# Patient Record
Sex: FEMALE | Race: WHITE | NOT HISPANIC OR LATINO | Employment: OTHER | URBAN - METROPOLITAN AREA
[De-identification: names, ages, dates, MRNs, and addresses within clinical notes are randomized per-mention and may not be internally consistent; named-entity substitution may affect disease eponyms.]

---

## 2017-01-09 ENCOUNTER — OFFICE VISIT (OUTPATIENT)
Dept: NEUROSURGERY | Facility: CLINIC | Age: 52
End: 2017-01-09

## 2017-01-09 VITALS — BODY MASS INDEX: 24.35 KG/M2 | WEIGHT: 124 LBS | HEIGHT: 60 IN | TEMPERATURE: 97.4 F

## 2017-01-09 DIAGNOSIS — M50.30 DEGENERATIVE DISC DISEASE, CERVICAL: ICD-10-CM

## 2017-01-09 DIAGNOSIS — M54.2 NECK PAIN: Primary | ICD-10-CM

## 2017-01-09 PROCEDURE — 99214 OFFICE O/P EST MOD 30 MIN: CPT | Performed by: NEUROLOGICAL SURGERY

## 2017-01-09 NOTE — MR AVS SNAPSHOT
Lissa Cheng   2017 12:30 PM   Office Visit    Dept Phone:  [phone]   Encounter #:  [id number]    Provider:  Roscoe Saini MD   Department:  [facility]                Your Full Care Plan              Your Updated Medication List          This list is accurate as of: 17  1:08 PM.  Always use your most recent med list.                DEXILANT 60 MG capsule   Generic drug:  dexlansoprazole       fexofenadine 180 MG tablet   Commonly known as:  ALLEGRA       metroNIDAZOLE 0.75 % cream   Commonly known as:  METROCREAM       raloxifene 60 MG tablet   Commonly known as:  EVISTA               You Were Diagnosed With        Codes Comments    Neck pain    -  Primary ICD-10-CM: M54.2  ICD-9-CM: 723.1     Degenerative disc disease, cervical     ICD-10-CM: M50.30  ICD-9-CM: 722.4       Instructions     None    Patient Instructions History      Upcoming Appointments     Visit Type Date Time Department    OFFICE VISIT 2017 12:30 PM [facility]      [facility] Signup     [facility] [employer] allows you to send messages to your doctor, view your test results, renew your prescriptions, schedule appointments, and more. To sign up, go to [employer] and click on the Sign Up Now link in the New User? box. Enter your [employer] Activation Code exactly as it appears below along with the last four digits of your Social Security Number and your Date of Birth () to complete the sign-up process. If you do not sign up before the expiration date, you must request a new code.    [employer] Activation Code: [email or web address]  Expires: 2017  1:08 PM    If you have questions, you can email [email or web address] or call [phone] to talk to our [employer] staff. Remember, [employer] is NOT to be used for urgent needs. For medical emergencies, dial 911.               Other Info from Your Visit           Allergies     No Known  "Allergies      Reason for Visit     Follow-up           Vital Signs     Temperature Height Weight Body Mass Index Smoking Status       97.4 °F (36.3 °C) 60\" (152.4 cm) 124 lb (56.2 kg) 24.22 kg/m2 Never Smoker       Problems and Diagnoses Noted     Neck pain    -  Primary    Degeneration of intervertebral disc of cervical region            "

## 2017-01-09 NOTE — LETTER
January 9, 2017     KIMBERLY Harper  [address] [facility]  [place] [address]    Patient: Lissa Cheng   YOB: 1965   Date of Visit: 1/9/2017       Dear KIMBERLY Mariano:    Lissa Cheng was in my office today. Below is a copy of my note.    If you have questions, please do not hesitate to call me. I look forward to following Lissa along with you.         Sincerely,        Roscoe Saini MD        CC: MD Massimo Leigh MD    Subjective     Chief Complaint: Neck pain    Patient ID: Lissa Cheng is a 51 y.o. female is here today for follow-up.    Neck Pain    This is a chronic problem. The current episode started more than 1 year ago. The problem occurs every several days. The problem has been unchanged. The pain is associated with nothing. The pain is present in the left side. The quality of the pain is described as aching and burning. The pain is at a severity of 6/10. The pain is moderate. The symptoms are aggravated by bending and coughing (Driving). Pertinent negatives include no chest pain, fever, headaches, leg pain, numbness, pain with swallowing, paresis, photophobia, trouble swallowing, visual change or weakness. She has tried acetaminophen, home exercises and NSAIDs for the symptoms. The treatment provided mild relief.       This is a 51-year-old woman who presents to my office with a chief complaint of neck pain which is slightly eccentric to the right side and occasional numbness radiating into her right arm.  She is undergoing chiropractic manipulation, which she states helps her neck pain for about a week before she has to go back.  She really has not tried much in the way of conservative treatment, specifically, she has not done any physical therapy, pain injections although she does take anti-inflammatories on an as-needed basis.    The following portions of the patient's history were reviewed and updated  as appropriate: allergies, current medications, past family history, past medical history, past social history, past surgical history and problem list.    Family history:   Family History   Problem Relation Age of Onset   • Cancer Mother    • Breast cancer Mother    • Heart attack Father    • Cancer Sister    • Breast cancer Sister    • Cancer Sister    • Colon cancer Sister    • Breast cancer Other    • Colon cancer Other        Social history:   Social History     Social History   • Marital status:      Spouse name: N/A   • Number of children: N/A   • Years of education: N/A     Occupational History   • Not on file.     Social History Main Topics   • Smoking status: Never Smoker   • Smokeless tobacco: Never Used   • Alcohol use No   • Drug use: No   • Sexual activity: Defer     Other Topics Concern   • Not on file     Social History Narrative       Review of Systems   Constitutional: Negative for activity change, appetite change, chills, diaphoresis, fatigue, fever and unexpected weight change.   HENT: Negative for congestion, dental problem, drooling, ear discharge, ear pain, facial swelling, hearing loss, mouth sores, nosebleeds, postnasal drip, rhinorrhea, sinus pressure, sneezing, sore throat, tinnitus, trouble swallowing and voice change.    Eyes: Negative for photophobia, pain, discharge, redness, itching and visual disturbance.   Respiratory: Negative for apnea, cough, choking, chest tightness, shortness of breath, wheezing and stridor.    Cardiovascular: Negative for chest pain, palpitations and leg swelling.   Gastrointestinal: Negative for abdominal distention, abdominal pain, anal bleeding, blood in stool, constipation, diarrhea, nausea, rectal pain and vomiting.   Genitourinary: Negative for difficulty urinating and hematuria.   Musculoskeletal: Positive for arthralgias, neck pain and neck stiffness. Negative for back pain, gait problem, joint swelling and myalgias.   Skin: Negative for color  "change, pallor, rash and wound.   Allergic/Immunologic: Negative for environmental allergies, food allergies and immunocompromised state.   Neurological: Negative for dizziness, tremors, seizures, syncope, facial asymmetry, speech difficulty, weakness, light-headedness, numbness and headaches.   Hematological: Negative for adenopathy. Does not bruise/bleed easily.   Psychiatric/Behavioral: Negative for agitation, behavioral problems, confusion, decreased concentration, dysphoric mood, self-injury, sleep disturbance and suicidal ideas. The patient is not nervous/anxious and is not hyperactive.    All other systems reviewed and are negative.      Objective   Temperature 97.4 °F (36.3 °C), height 60\" (152.4 cm), weight 124 lb (56.2 kg).  Body mass index is 24.22 kg/(m^2).    Physical Exam   Constitutional: She is oriented to person, place, and time. She appears well-developed.  Non-toxic appearance.   HENT:   Head: Normocephalic and atraumatic.   Right Ear: Hearing normal.   Left Ear: Hearing normal.   Nose: Nose normal.   Eyes: Conjunctivae, EOM and lids are normal. Pupils are equal, round, and reactive to light.   Neck: Normal range of motion. No JVD present. No tracheal deviation present. No thyromegaly present.   Cardiovascular: Normal rate and regular rhythm.    Pulses:       Radial pulses are 2+ on the right side, and 2+ on the left side.   Pulmonary/Chest: Effort normal. No stridor. No respiratory distress. She has no wheezes.   Neurological: She is alert and oriented to person, place, and time. She has normal reflexes. She displays normal reflexes. No cranial nerve deficit. She exhibits normal muscle tone. GCS eye subscore is 4. GCS verbal subscore is 5. GCS motor subscore is 6.   Reflex Scores:       Tricep reflexes are 2+ on the right side and 2+ on the left side.       Bicep reflexes are 2+ on the right side and 2+ on the left side.       Brachioradialis reflexes are 2+ on the right side and 2+ on the left " side.  Skin: Skin is warm and dry. No rash noted. No erythema.   Psychiatric: She has a normal mood and affect. Her behavior is normal. Judgment and thought content normal.   Nursing note and vitals reviewed.        Assessment/Plan     Independent Review of Radiographic Studies:      She has no new imaging for me to review.    Medical Decision Making:      This is a 51-year-old woman with chronic, axial neck pain with intermittent complaints of possible cervical radiculopathy.    A long and protracted conversation was held with the patient regarding conservative treatment of chronic neck pain.  I discussed the importance of physical therapy, regular exercise, and trigger point massage.    I would like to follow up with her in 6 months, or sooner if clinically indicated.  I reviewed the signs and symptoms of cervical radiculopathy as well as cervical myelopathy with the patient.  I directed her to contact my office with any new neurological symptoms.    There are no diagnoses linked to this encounter.    No Follow-up on file.           This document signed by JULIUS Saini MD January 9, 2017 12:21 PM

## 2017-01-09 NOTE — PROGRESS NOTES
Subjective     Chief Complaint: Neck pain    Patient ID: Lissa Cheng is a 51 y.o. female is here today for follow-up.    Neck Pain    This is a chronic problem. The current episode started more than 1 year ago. The problem occurs every several days. The problem has been unchanged. The pain is associated with nothing. The pain is present in the left side. The quality of the pain is described as aching and burning. The pain is at a severity of 6/10. The pain is moderate. The symptoms are aggravated by bending and coughing (Driving). Pertinent negatives include no chest pain, fever, headaches, leg pain, numbness, pain with swallowing, paresis, photophobia, trouble swallowing, visual change or weakness. She has tried acetaminophen, home exercises and NSAIDs for the symptoms. The treatment provided mild relief.       This is a 51-year-old woman who presents to my office with a chief complaint of neck pain which is slightly eccentric to the right side and occasional numbness radiating into her right arm.  She is undergoing chiropractic manipulation, which she states helps her neck pain for about a week before she has to go back.  She really has not tried much in the way of conservative treatment, specifically, she has not done any physical therapy, pain injections although she does take anti-inflammatories on an as-needed basis.    The following portions of the patient's history were reviewed and updated as appropriate: allergies, current medications, past family history, past medical history, past social history, past surgical history and problem list.    Family history:   Family History   Problem Relation Age of Onset   • Cancer Mother    • Breast cancer Mother    • Heart attack Father    • Cancer Sister    • Breast cancer Sister    • Cancer Sister    • Colon cancer Sister    • Breast cancer Other    • Colon cancer Other        Social history:   Social History     Social History   • Marital status:       Spouse name: N/A   • Number of children: N/A   • Years of education: N/A     Occupational History   • Not on file.     Social History Main Topics   • Smoking status: Never Smoker   • Smokeless tobacco: Never Used   • Alcohol use No   • Drug use: No   • Sexual activity: Defer     Other Topics Concern   • Not on file     Social History Narrative       Review of Systems   Constitutional: Negative for activity change, appetite change, chills, diaphoresis, fatigue, fever and unexpected weight change.   HENT: Negative for congestion, dental problem, drooling, ear discharge, ear pain, facial swelling, hearing loss, mouth sores, nosebleeds, postnasal drip, rhinorrhea, sinus pressure, sneezing, sore throat, tinnitus, trouble swallowing and voice change.    Eyes: Negative for photophobia, pain, discharge, redness, itching and visual disturbance.   Respiratory: Negative for apnea, cough, choking, chest tightness, shortness of breath, wheezing and stridor.    Cardiovascular: Negative for chest pain, palpitations and leg swelling.   Gastrointestinal: Negative for abdominal distention, abdominal pain, anal bleeding, blood in stool, constipation, diarrhea, nausea, rectal pain and vomiting.   Genitourinary: Negative for difficulty urinating and hematuria.   Musculoskeletal: Positive for arthralgias, neck pain and neck stiffness. Negative for back pain, gait problem, joint swelling and myalgias.   Skin: Negative for color change, pallor, rash and wound.   Allergic/Immunologic: Negative for environmental allergies, food allergies and immunocompromised state.   Neurological: Negative for dizziness, tremors, seizures, syncope, facial asymmetry, speech difficulty, weakness, light-headedness, numbness and headaches.   Hematological: Negative for adenopathy. Does not bruise/bleed easily.   Psychiatric/Behavioral: Negative for agitation, behavioral problems, confusion, decreased concentration, dysphoric mood, self-injury, sleep disturbance  "and suicidal ideas. The patient is not nervous/anxious and is not hyperactive.    All other systems reviewed and are negative.      Objective   Temperature 97.4 °F (36.3 °C), height 60\" (152.4 cm), weight 124 lb (56.2 kg).  Body mass index is 24.22 kg/(m^2).    Physical Exam   Constitutional: She is oriented to person, place, and time. She appears well-developed.  Non-toxic appearance.   HENT:   Head: Normocephalic and atraumatic.   Right Ear: Hearing normal.   Left Ear: Hearing normal.   Nose: Nose normal.   Eyes: Conjunctivae, EOM and lids are normal. Pupils are equal, round, and reactive to light.   Neck: Normal range of motion. No JVD present. No tracheal deviation present. No thyromegaly present.   Cardiovascular: Normal rate and regular rhythm.    Pulses:       Radial pulses are 2+ on the right side, and 2+ on the left side.   Pulmonary/Chest: Effort normal. No stridor. No respiratory distress. She has no wheezes.   Neurological: She is alert and oriented to person, place, and time. She has normal reflexes. She displays normal reflexes. No cranial nerve deficit. She exhibits normal muscle tone. GCS eye subscore is 4. GCS verbal subscore is 5. GCS motor subscore is 6.   Reflex Scores:       Tricep reflexes are 2+ on the right side and 2+ on the left side.       Bicep reflexes are 2+ on the right side and 2+ on the left side.       Brachioradialis reflexes are 2+ on the right side and 2+ on the left side.  Skin: Skin is warm and dry. No rash noted. No erythema.   Psychiatric: She has a normal mood and affect. Her behavior is normal. Judgment and thought content normal.   Nursing note and vitals reviewed.        Assessment/Plan     Independent Review of Radiographic Studies:      She has no new imaging for me to review.    Medical Decision Making:      This is a 51-year-old woman with chronic, axial neck pain with intermittent complaints of possible cervical radiculopathy.    A long and protracted conversation " was held with the patient regarding conservative treatment of chronic neck pain.  I discussed the importance of physical therapy, regular exercise, and trigger point massage.    I would like to follow up with her in 6 months, or sooner if clinically indicated.  I reviewed the signs and symptoms of cervical radiculopathy as well as cervical myelopathy with the patient.  I directed her to contact my office with any new neurological symptoms.    There are no diagnoses linked to this encounter.    No Follow-up on file.           This document signed by JULIUS Saini MD January 9, 2017 12:21 PM

## 2017-03-17 PROBLEM — Z01.419 WELL WOMAN EXAM WITH ROUTINE GYNECOLOGICAL EXAM: Status: ACTIVE | Noted: 2017-03-17

## 2017-06-21 ENCOUNTER — OFFICE VISIT (OUTPATIENT)
Dept: NEUROSURGERY | Facility: CLINIC | Age: 52
End: 2017-06-21

## 2017-06-21 VITALS
HEIGHT: 60 IN | WEIGHT: 123 LBS | DIASTOLIC BLOOD PRESSURE: 60 MMHG | SYSTOLIC BLOOD PRESSURE: 130 MMHG | BODY MASS INDEX: 24.15 KG/M2 | TEMPERATURE: 97.9 F

## 2017-06-21 DIAGNOSIS — M54.2 NECK PAIN: Primary | ICD-10-CM

## 2017-06-21 DIAGNOSIS — M50.30 DEGENERATIVE DISC DISEASE, CERVICAL: ICD-10-CM

## 2017-06-21 PROCEDURE — 99215 OFFICE O/P EST HI 40 MIN: CPT | Performed by: NEUROLOGICAL SURGERY

## 2017-06-21 RX ORDER — ERGOCALCIFEROL (VITAMIN D2) 10 MCG
400 TABLET ORAL DAILY
COMMUNITY

## 2017-06-21 RX ORDER — MULTIVIT WITH MINERALS/LUTEIN
250 TABLET ORAL DAILY
COMMUNITY

## 2017-06-21 RX ORDER — MULTIPLE VITAMINS W/ MINERALS TAB 9MG-400MCG
1 TAB ORAL DAILY
COMMUNITY

## 2017-06-21 RX ORDER — UBIDECARENONE 75 MG
50 CAPSULE ORAL DAILY
COMMUNITY

## 2017-06-21 NOTE — PROGRESS NOTES
Subjective     Chief Complaint: Neck pain    Patient ID: Lissa Cheng is a 51 y.o. female is here today for follow-up.    Neck Pain    This is a chronic problem. The current episode started more than 1 year ago. The problem occurs every several days. The problem has been unchanged. The pain is associated with nothing. The pain is present in the left side. The quality of the pain is described as aching and burning. The pain is at a severity of 6/10. The pain is mild. The symptoms are aggravated by bending, position and twisting (Driving). The pain is same all the time. Pertinent negatives include no chest pain, fever, headaches, leg pain, numbness, pain with swallowing, paresis, photophobia, syncope, tingling, trouble swallowing, visual change, weakness or weight loss. She has tried acetaminophen, home exercises and NSAIDs for the symptoms. The treatment provided significant relief.       This is a 51-year-old woman who following for some chronic, axial neck pain without significant cervical radiculopathy for several months now.    The following portions of the patient's history were reviewed and updated as appropriate: allergies, current medications, past family history, past medical history, past social history, past surgical history and problem list.    Family history:   Family History   Problem Relation Age of Onset   • Cancer Mother    • Breast cancer Mother    • Heart attack Father    • Cancer Sister    • Breast cancer Sister    • Cancer Sister    • Colon cancer Sister    • Breast cancer Other    • Colon cancer Other        Social history:   Social History     Social History   • Marital status:      Spouse name: N/A   • Number of children: N/A   • Years of education: N/A     Occupational History   • Not on file.     Social History Main Topics   • Smoking status: Never Smoker   • Smokeless tobacco: Never Used   • Alcohol use No   • Drug use: No   • Sexual activity: Defer     Other Topics Concern   •  Not on file     Social History Narrative       Review of Systems   Constitutional: Negative for activity change, appetite change, chills, diaphoresis, fatigue, fever, unexpected weight change and weight loss.   HENT: Negative for congestion, dental problem, drooling, ear discharge, ear pain, facial swelling, hearing loss, mouth sores, nosebleeds, postnasal drip, rhinorrhea, sinus pressure, sneezing, sore throat, tinnitus, trouble swallowing and voice change.    Eyes: Negative for photophobia, pain, discharge, redness, itching and visual disturbance.   Respiratory: Negative for apnea, cough, choking, chest tightness, shortness of breath, wheezing and stridor.    Cardiovascular: Negative for chest pain, palpitations, leg swelling and syncope.   Gastrointestinal: Negative for abdominal distention, abdominal pain, anal bleeding, blood in stool, constipation, diarrhea, nausea, rectal pain and vomiting.   Endocrine: Negative for cold intolerance, heat intolerance, polydipsia, polyphagia and polyuria.   Genitourinary: Negative for decreased urine volume, difficulty urinating, dysuria, enuresis, flank pain, frequency, genital sores, hematuria and urgency.   Musculoskeletal: Positive for neck stiffness. Negative for arthralgias, back pain, gait problem, joint swelling, myalgias and neck pain.   Skin: Negative for color change, pallor, rash and wound.   Allergic/Immunologic: Negative for environmental allergies, food allergies and immunocompromised state.   Neurological: Positive for dizziness. Negative for tingling, tremors, seizures, syncope, facial asymmetry, speech difficulty, weakness, light-headedness, numbness and headaches.   Hematological: Negative for adenopathy. Does not bruise/bleed easily.   Psychiatric/Behavioral: Negative for agitation, behavioral problems, confusion, decreased concentration, dysphoric mood, hallucinations, self-injury, sleep disturbance and suicidal ideas. The patient is not nervous/anxious  "and is not hyperactive.        Objective   Blood pressure 130/60, temperature 97.9 °F (36.6 °C), height 60\" (152.4 cm), weight 123 lb (55.8 kg).  Body mass index is 24.02 kg/(m^2).    Physical Exam   Constitutional: She is oriented to person, place, and time. She appears well-developed.  Non-toxic appearance.   HENT:   Head: Normocephalic and atraumatic.   Right Ear: Hearing normal.   Left Ear: Hearing normal.   Nose: Nose normal.   Eyes: Conjunctivae, EOM and lids are normal. Pupils are equal, round, and reactive to light.   Neck: Normal range of motion. No JVD present. No tracheal deviation present. No thyromegaly present.   Cardiovascular: Normal rate and regular rhythm.    Pulses:       Radial pulses are 2+ on the right side, and 2+ on the left side.   Pulmonary/Chest: Effort normal. No stridor. No respiratory distress. She has no wheezes.   Neurological: She is alert and oriented to person, place, and time. She has normal reflexes. She displays normal reflexes. No cranial nerve deficit. She exhibits normal muscle tone. GCS eye subscore is 4. GCS verbal subscore is 5. GCS motor subscore is 6.   Reflex Scores:       Tricep reflexes are 2+ on the right side and 2+ on the left side.       Bicep reflexes are 2+ on the right side and 2+ on the left side.       Brachioradialis reflexes are 2+ on the right side and 2+ on the left side.  Skin: Skin is warm and dry. No rash noted. No erythema.   Psychiatric: She has a normal mood and affect. Her behavior is normal. Judgment and thought content normal.   Nursing note and vitals reviewed.        Assessment/Plan     Independent Review of Radiographic Studies:      She has no new imaging for me to review.  Her MRI of the cervical spine from 3/8/16 discloses mild degenerative disc disease at C4 5 and C5 6 without significant lateral recess or neural element impingement.    Medical Decision Making:      Her pain is improved, although not gone clearly, since doing physical " therapy.  She reports that she started doing Pilates again.  She did not have any complaints of cervical radiculopathy or cervical myelopathy.    My recommendation is for continued exercise and physical therapy exercises as well as as needed NSAIDs for pain.    I reviewed the signs and symptoms of cervical radiculopathy and cervical myelopathy with her.    She can follow-up with me on an as-needed basis.    Lissa was seen today for neck pain.    Diagnoses and all orders for this visit:    Neck pain    Degenerative disc disease, cervical        Return if symptoms worsen or fail to improve.           This document signed by JULIUS Saini MD June 21, 2017 11:27 AM

## 2017-11-30 ENCOUNTER — OFFICE VISIT (OUTPATIENT)
Dept: OBSTETRICS AND GYNECOLOGY | Facility: CLINIC | Age: 52
End: 2017-11-30

## 2017-11-30 VITALS
DIASTOLIC BLOOD PRESSURE: 80 MMHG | HEIGHT: 60 IN | SYSTOLIC BLOOD PRESSURE: 120 MMHG | BODY MASS INDEX: 24.94 KG/M2 | WEIGHT: 127 LBS

## 2017-11-30 DIAGNOSIS — N60.11 FIBROCYSTIC BREAST CHANGES, BILATERAL: Primary | ICD-10-CM

## 2017-11-30 DIAGNOSIS — N60.12 FIBROCYSTIC BREAST CHANGES, BILATERAL: Primary | ICD-10-CM

## 2017-11-30 DIAGNOSIS — Z01.419 ENCOUNTER FOR GYNECOLOGICAL EXAMINATION WITHOUT ABNORMAL FINDING: ICD-10-CM

## 2017-11-30 PROCEDURE — 99396 PREV VISIT EST AGE 40-64: CPT | Performed by: OBSTETRICS & GYNECOLOGY

## 2017-11-30 NOTE — PROGRESS NOTES
Chief Complaint   Patient presents with   • Gynecologic Exam       Lissa Cheng is a 52 y.o. year old  presenting to be seen for her annual exam.This patient has been followed by Dr. Shane Newell.  She has previously had a vaginal hysterectomy for uterine prolapse.  She has a family history of breast cancer with her mother , and her sister living with metastatic disease.  She has a history of fibrocystic changes of the breast with recent benign breast imaging  She has a diagnosis of osteoporosis of the hip and is currently using Prolia subcutaneous injections every 6 months per Dr. Cornel Choudhary.    SCREENING TESTS    Year 2012   Age                         PAP    Neg.                     HPV high risk                         Mammogram     benign benign                   JULIO CÉSAR score                         Breast MRI                         Lipids                         Vitamin D                         Colonoscopy                         DEXA  Frax (hip/any)                         Ovarian Screen                             She exercises regularly: yes.  She wears her seat belt: yes.  She has concerns about domestic violence: no.  She has noticed changes in height: no    GYN screening history:  · Last mammogram: was done on approximately 3/14/2017 and the result was: Birads II (Benign findings)..    No Additional Complaints Reported    The following portions of the patient's history were reviewed and updated as appropriate:vital signs and   She  does not have any pertinent problems on file.  She  has a past surgical history that includes Kidney stone surgery; Excision basal cell carcinoma; [place] tooth extraction; Vaginal delivery (); Vaginal delivery (); Breast biopsy (Left, 2007); and Hysterectomy (1999).  Her family history includes Breast cancer in her mother, other, and  "sister; Cancer in her mother, sister, and sister; Colon cancer in her other and sister; Heart attack in her father.  She  reports that she has never smoked. She has never used smokeless tobacco. She reports that she does not drink alcohol or use illicit drugs.  Current Outpatient Prescriptions   Medication Sig Dispense Refill   • Apple Cider Vinegar 188 MG capsule Take 1 capsule by mouth Daily.     • calcium acetate (PHOSLO) 667 MG capsule Take 1,334 mg by mouth 3 (Three) Times a Day With Meals.     • dexlansoprazole (DEXILANT) 60 MG capsule Take 60 mg by mouth Daily.     • fexofenadine (ALLEGRA) 180 MG tablet Take 180 mg by mouth As Needed.     • magnesium oxide (MAGOX) 400 (241.3 MG) MG tablet tablet Take 400 mg by mouth Daily.     • metroNIDAZOLE (METROCREAM) 0.75 % cream Apply  topically 2 (Two) Times a Day.     • Multiple Vitamins-Minerals (MULTIVITAMIN WITH MINERALS) tablet tablet Take 1 tablet by mouth Daily.     • raloxifene (EVISTA) 60 MG tablet Take 60 mg by mouth Daily.     • vitamin B-12 (CYANOCOBALAMIN) 100 MCG tablet Take 50 mcg by mouth Daily.     • vitamin C (ASCORBIC ACID) 250 MG tablet Take 250 mg by mouth Daily.     • Vitamin D, Cholecalciferol, (CHOLECALCIFEROL) 400 UNITS tablet Take 400 Units by mouth Daily.       No current facility-administered medications for this visit.      She has No Known Allergies..    Review of Systems  A comprehensive review of systems was taken.  Constitutional: negative for fever, chills, activity change, appetite change, fatigue and unexpected weight change.  Respiratory: negative  Cardiovascular: negative  Gastrointestinal: negative  Genitourinary:negative  Musculoskeletal:negative  Behavioral/Psych: negative       /80  Ht 60\" (152.4 cm)  Wt 127 lb (57.6 kg)  BMI 24.8 kg/m2    Physical Exam    General:  alert; cooperative; well developed; well nourished   Skin:  No suspicious lesions seen   Thyroid: normal to inspection and palpation   Lungs:  clear to " auscultation bilaterally   Heart:  regular rate and rhythm, S1, S2 normal, no murmur, click, rub or gallop   Breasts:  Examined in supine position  Symmetric without masses or skin dimpling  Nipples normal without inversion, lesions or discharge  There are no palpable axillary nodes  Fibrocystic changes are present both breasts without a discrete mass   Abdomen: soft, non-tender; no masses  no umbilical or inginual hernias are present  no hepato-splenomegaly   Pelvis: Clinical staff was present for exam  External genitalia:  normal appearance of the external genitalia including Bartholin's and [place]'s glands.  Vaginal:  normal pink mucosa without prolapse or lesions.  Cervix:  absent.  Uterus:  absent.  Adnexa:  non palpable bilaterally.  Rectal:  anus visually normal appearing. recto-vaginal exam unremarkable and confirms findings;     Lab Review   No data reviewed    Imaging  Mammogram results- benign         ASSESSMENT  Problems Addressed this Visit        Other    Fibrocystic breast changes, bilateral - Primary      Other Visit Diagnoses     Encounter for gynecological examination without abnormal finding              PLAN    Medications prescribed this encounter:    New Medications Ordered This Visit   Medications   • Apple Cider Vinegar 188 MG capsule     Sig: Take 1 capsule by mouth Daily.   ·   · Calcium, 600 mg/ Vit. D, 400 IU daily; regular weight-bearing exercise  · Follow up: 12 month(s)  *Please note that portions of this documentation may have been completed with a voice recognition program.  Efforts were made to edit this dictation, but occasional words may have been mistranscribed.       This note was electronically signed.    GORDY Lares MD  November 30, 2017  3:59 PM

## 2018-12-04 ENCOUNTER — OFFICE VISIT (OUTPATIENT)
Dept: OBSTETRICS AND GYNECOLOGY | Facility: CLINIC | Age: 53
End: 2018-12-04

## 2018-12-04 VITALS
SYSTOLIC BLOOD PRESSURE: 110 MMHG | BODY MASS INDEX: 25.01 KG/M2 | DIASTOLIC BLOOD PRESSURE: 70 MMHG | HEIGHT: 60 IN | WEIGHT: 127.4 LBS

## 2018-12-04 DIAGNOSIS — N60.12 FIBROCYSTIC BREAST CHANGES, BILATERAL: ICD-10-CM

## 2018-12-04 DIAGNOSIS — Z01.419 ENCOUNTER FOR GYNECOLOGICAL EXAMINATION WITHOUT ABNORMAL FINDING: ICD-10-CM

## 2018-12-04 DIAGNOSIS — Z78.0 MENOPAUSE: Primary | ICD-10-CM

## 2018-12-04 DIAGNOSIS — N60.11 FIBROCYSTIC BREAST CHANGES, BILATERAL: ICD-10-CM

## 2018-12-04 PROCEDURE — 99396 PREV VISIT EST AGE 40-64: CPT | Performed by: OBSTETRICS & GYNECOLOGY

## 2018-12-04 RX ORDER — IBUPROFEN 200 MG
1 CAPSULE ORAL 2 TIMES DAILY
COMMUNITY

## 2018-12-04 NOTE — PROGRESS NOTES
Chief Complaint   Patient presents with   • Gynecologic Exam   • Back Pain     intermittent X 1 yr/has taken Aleve X 1 and did have some relief of pain.       Lissa Cheng is a 53 y.o. year old  presenting to be seen for her annual exam.  This patient has previously had a vaginal hysterectomy by Dr. Shane Newell.  She does not take estrogen replacement therapy.  She has complaints of upper back pain radiating around into her chest.  She has been remodeling her house.  She does strenuous labor.  She denies bowel or urinary symptoms.  She has a history of osteoporosis which has been treated with raloxifene and with Prolia subcutaneous injections.  She is followed by Dr. Cornel Choudhary at the [facility].    SCREENING TESTS    Year 2012   Age                         PAP   Neg.                      HPV high risk                         Mammogram       benign                  JULIO CÉSAR score                         Breast MRI                         Lipids                         Vitamin D                         Colonoscopy                         DEXA  Frax (hip/any)                         Ovarian Screen                             She exercises regularly: yes.  She wears her seat belt: yes.  She has concerns about domestic violence: no.  She has noticed changes in height: no    GYN screening history:  · Last mammogram: was done on approximately 3/14/2018 and the result was: Birads II (Benign findings)..    No Additional Complaints Reported    The following portions of the patient's history were reviewed and updated as appropriate:vital signs and   She  has a past medical history of Acid reflux, Fibrocystic breast, History of kidney stones, Irritability, Menopausal symptoms, Mood swings, Osteopenia, Osteoporosis, Ovarian cyst, Pain, Pelvic pain, S/P vaginal hysterectomy, Uterine prolapse, and Vaginal  irritation.  She does not have any pertinent problems on file.  She  has a past surgical history that includes Kidney stone surgery; Excision basal cell carcinoma; [place] tooth extraction; Vaginal delivery (1992); Vaginal delivery (1994); Breast biopsy (Left, 12/18/2007); Hysterectomy (06/21/1999); and Cystoscopy w/ laser lithotripsy.  Her family history includes Breast cancer in her mother, other, and sister; Cancer in her mother, sister, and sister; Colon cancer in her other and sister; Heart attack in her father.  She  reports that  has never smoked. she has never used smokeless tobacco. She reports that she does not drink alcohol or use drugs.  Current Outpatient Medications   Medication Sig Dispense Refill   • calcium carbonate (OS-THAO) 1250 (500 Ca) MG tablet Take 1 tablet by mouth 2 (Two) Times a Day.     • dexlansoprazole (DEXILANT) 60 MG capsule Take 60 mg by mouth Daily.     • magnesium oxide (MAGOX) 400 (241.3 MG) MG tablet tablet Take 400 mg by mouth Daily.     • metroNIDAZOLE (METROCREAM) 0.75 % cream Apply  topically 2 (Two) Times a Day.     • Multiple Vitamins-Minerals (MULTIVITAMIN WITH MINERALS) tablet tablet Take 1 tablet by mouth Daily.     • raloxifene (EVISTA) 60 MG tablet Take 60 mg by mouth Daily.     • vitamin B-12 (CYANOCOBALAMIN) 100 MCG tablet Take 50 mcg by mouth Daily.     • vitamin C (ASCORBIC ACID) 250 MG tablet Take 250 mg by mouth Daily.     • Vitamin D, Cholecalciferol, (CHOLECALCIFEROL) 400 UNITS tablet Take 400 Units by mouth Daily.       No current facility-administered medications for this visit.      She is allergic to bacitracin-polymyxin b..    Review of Systems  A comprehensive review of systems was taken.  Constitutional: negative for fever, chills, activity change, appetite change, fatigue and unexpected weight change.  Respiratory: negative  Cardiovascular: negative  Gastrointestinal: negative  Genitourinary:negative  Musculoskeletal:positive for back  "pain  Behavioral/Psych: negative       /70   Ht 152.4 cm (60\")   Wt 57.8 kg (127 lb 6.4 oz)   BMI 24.88 kg/m²     Physical Exam    General:  alert; cooperative; well developed; well nourished   Skin:  No suspicious lesions seen   Thyroid: normal to inspection and palpation   Lungs:  clear to auscultation bilaterally   Heart:  regular rate and rhythm, S1, S2 normal, no murmur, click, rub or gallop   Breasts:  Examined in supine position  Symmetric without masses or skin dimpling  Nipples normal without inversion, lesions or discharge  There are no palpable axillary nodes  Fibrocystic changes are present both breasts without a discrete mass   Abdomen: soft, non-tender; no masses  no umbilical or inguinal hernias are present  no hepato-splenomegaly   Pelvis: Clinical staff was present for exam  External genitalia:  normal appearance of the external genitalia including Bartholin's and [place]'s glands.  Vaginal:  normal pink mucosa without prolapse or lesions.  Cervix:  absent.  Uterus:  absent.  Adnexa:  non palpable bilaterally.  Rectal:  anus visually normal appearing. recto-vaginal exam unremarkable and confirms findings;     Lab Review   No data reviewed    Imaging  Mammogram results- Birads II         ASSESSMENT  Problems Addressed this Visit        Genitourinary    Menopause - Primary       Other    Fibrocystic breast changes, bilateral      Other Visit Diagnoses     Encounter for gynecological examination without abnormal finding              PLAN    · Medications prescribed this encounter:  No orders of the defined types were placed in this encounter.  · Monthly self breast assessment and annual breast imaging  · Follow-up with rheumatology  · Calcium, 600 mg/ Vit. D, 400 IU daily; regular weight-bearing exercise  · Follow up: 12 month(s)  *Please note that portions of this documentation may have been completed with a voice recognition program.  Efforts were made to edit this dictation, but occasional " words may have been mistranscribed.       This note was electronically signed.    GORDY Lares MD  December 4, 2018  4:06 PM

## 2019-12-09 ENCOUNTER — OFFICE VISIT (OUTPATIENT)
Dept: OBSTETRICS AND GYNECOLOGY | Facility: CLINIC | Age: 54
End: 2019-12-09

## 2019-12-09 VITALS
SYSTOLIC BLOOD PRESSURE: 118 MMHG | HEIGHT: 60 IN | WEIGHT: 134.6 LBS | BODY MASS INDEX: 26.42 KG/M2 | DIASTOLIC BLOOD PRESSURE: 70 MMHG

## 2019-12-09 DIAGNOSIS — Z01.419 ENCOUNTER FOR GYNECOLOGICAL EXAMINATION WITHOUT ABNORMAL FINDING: ICD-10-CM

## 2019-12-09 DIAGNOSIS — N60.12 FIBROCYSTIC BREAST CHANGES, BILATERAL: ICD-10-CM

## 2019-12-09 DIAGNOSIS — Z78.0 MENOPAUSE: Primary | ICD-10-CM

## 2019-12-09 DIAGNOSIS — N60.11 FIBROCYSTIC BREAST CHANGES, BILATERAL: ICD-10-CM

## 2019-12-09 PROCEDURE — 99396 PREV VISIT EST AGE 40-64: CPT | Performed by: OBSTETRICS & GYNECOLOGY

## 2019-12-09 RX ORDER — ESCITALOPRAM OXALATE 10 MG/1
1 TABLET ORAL DAILY
Refills: 0 | COMMUNITY
Start: 2019-11-26 | End: 2020-12-14

## 2019-12-09 RX ORDER — METHYLDOPA/HYDROCHLOROTHIAZIDE 250MG-15MG
1 TABLET ORAL DAILY
COMMUNITY

## 2019-12-09 RX ORDER — CRANBERRY FRUIT EXTRACT 650 MG
CAPSULE ORAL
COMMUNITY
End: 2021-08-10

## 2019-12-09 NOTE — PROGRESS NOTES
Chief Complaint   Patient presents with   • Gynecologic Exam       Lissa Cheng is a 54 y.o. year old  presenting to be seen for her annual exam.  This patient has previously had a vaginal hysterectomy.  She has a diagnosis of osteoporosis and is managed at the [facility] by Dr. Choudhary.  She currently receives Prolia subcutaneous injections every 6 months and has done so for 4 years.  She states that her bone density has improved to the osteopenic range.  She also takes raloxifene, 60 mg by mouth daily.  She denies side effects on this medication.  She denies bowel or urinary symptoms.    SCREENING TESTS    Year 2012   Age                         PAP    Neg.                     HPV high risk                         Mammogram       benign benign                 JULIO CÉSAR score                         Breast MRI                         Lipids                         Vitamin D                         Colonoscopy                         DEXA  Frax (hip/any)                         Ovarian Screen                             She exercises regularly: yes.  She wears her seat belt: yes.  She has concerns about domestic violence: no.  She has noticed changes in height: no    GYN screening history:  · Last mammogram: was done on approximately 3/15/2019 and the result was: Birads II (Benign findings)..    No Additional Complaints Reported    The following portions of the patient's history were reviewed and updated as appropriate:vital signs and   She  has a past medical history of Acid reflux, Anxiety, Fibrocystic breast, History of kidney stones, Irritability, Menopausal symptoms, Mood swings, Osteopenia, Osteoporosis, Ovarian cyst, Pain, Pelvic pain, S/P vaginal hysterectomy, Uterine prolapse, and Vaginal irritation.  She does not have any pertinent problems on file.  She  has a past surgical history that  includes Kidney stone surgery; Excision basal cell carcinoma; [place] tooth extraction; Vaginal delivery (1992); Vaginal delivery (1994); Breast biopsy (Left, 12/18/2007); Hysterectomy (06/21/1999); and Cystoscopy w/ laser lithotripsy.  Her family history includes Breast cancer in her mother, sister, and another family member; Cancer in her mother, sister, and sister; Colon cancer in her sister and another family member; Heart attack in her father.  She  reports that she has never smoked. She has never used smokeless tobacco. She reports that she does not drink alcohol or use drugs.  Current Outpatient Medications   Medication Sig Dispense Refill   • Cranberry (THERACRAN ONE PO) Take  by mouth.     • Nutritional Supplements (THERALITH XR) tablet Take  by mouth.     • calcium carbonate (OS-THAO) 1250 (500 Ca) MG tablet Take 1 tablet by mouth 2 (Two) Times a Day.     • denosumab (PROLIA) 60 MG/ML solution syringe Inject 1 mL under the skin into the appropriate area as directed Every 6 (Six) Months.     • dexlansoprazole (DEXILANT) 60 MG capsule Take 60 mg by mouth Daily.     • escitalopram (LEXAPRO) 10 MG tablet Take 1 tablet by mouth Daily.  0   • magnesium oxide (MAGOX) 400 (241.3 MG) MG tablet tablet Take 400 mg by mouth Daily.     • Menatetrenone (VITAMIN K2) 100 MCG tablet Take 1 tablet by mouth Daily.     • metroNIDAZOLE (METROCREAM) 0.75 % cream Apply  topically 2 (Two) Times a Day.     • Multiple Vitamins-Minerals (MULTIVITAMIN WITH MINERALS) tablet tablet Take 1 tablet by mouth Daily.     • raloxifene (EVISTA) 60 MG tablet Take 60 mg by mouth Daily.     • vitamin B-12 (CYANOCOBALAMIN) 100 MCG tablet Take 50 mcg by mouth Daily.     • vitamin C (ASCORBIC ACID) 250 MG tablet Take 250 mg by mouth Daily.     • Vitamin D, Cholecalciferol, (CHOLECALCIFEROL) 400 UNITS tablet Take 400 Units by mouth Daily.       No current facility-administered medications for this visit.      She is allergic to bacitracin-polymyxin  "b..    Review of Systems  A comprehensive review of systems was taken.  Constitutional: negative for fever, chills, activity change, appetite change, fatigue and unexpected weight change.  Respiratory: negative  Cardiovascular: negative  Gastrointestinal: negative  Genitourinary:negative  Musculoskeletal:negative  Behavioral/Psych: negative       /70   Ht 152.4 cm (60\")   Wt 61.1 kg (134 lb 9.6 oz)   Breastfeeding No   BMI 26.29 kg/m²     Physical Exam    General:  alert; cooperative; well developed; well nourished   Skin:  No suspicious lesions seen   Thyroid: normal to inspection and palpation   Lungs:  clear to auscultation bilaterally   Heart:  regular rate and rhythm, S1, S2 normal, no murmur, click, rub or gallop   Breasts:  Examined in supine position  Symmetric without masses or skin dimpling  Nipples normal without inversion, lesions or discharge  There are no palpable axillary nodes  Fibrocystic changes are present both breasts without a discrete mass   Abdomen: soft, non-tender; no masses  no umbilical or inguinal hernias are present  no hepato-splenomegaly   Pelvis: Clinical staff was present for exam  External genitalia:  normal appearance of the external genitalia including Bartholin's and [place]'s glands.  Vaginal:  normal pink mucosa without prolapse or lesions.  Cervix:  absent.  Uterus:  absent.  Adnexa:  non palpable bilaterally.  Rectal:  anus visually normal appearing. recto-vaginal exam unremarkable and confirms findings;     Lab Review   No data reviewed    Imaging  Mammogram results         ASSESSMENT  Problems Addressed this Visit        Genitourinary    Menopause - Primary       Other    Fibrocystic breast changes, bilateral      Other Visit Diagnoses     Encounter for gynecological examination without abnormal finding                  Substance History:   reports that she has never smoked. She has never used smokeless tobacco.   reports that she does not drink alcohol.   reports " that she does not use drugs.    Substance use counseling is not indicated based on patient history.      PLAN    · Medications prescribed this encounter:  No orders of the defined types were placed in this encounter.  · Monthly self breast assessment and annual breast imaging  · Continued follow-up with Rheumatology  · Calcium, 600 mg/ Vit. D, 400 IU daily; regular weight-bearing exercise  · Follow up: 12 month(s)  *Please note that portions of this documentation may have been completed with a voice recognition program.  Efforts were made to edit this dictation, but occasional words may have been mistranscribed.       This note was electronically signed.    GORDY Lares MD  December 9, 2019  4:01 PM

## 2020-10-14 ENCOUNTER — APPOINTMENT (OUTPATIENT)
Dept: PREADMISSION TESTING | Facility: HOSPITAL | Age: 55
End: 2020-10-14

## 2020-10-15 ENCOUNTER — ANESTHESIA EVENT (OUTPATIENT)
Dept: PERIOP | Facility: HOSPITAL | Age: 55
End: 2020-10-15

## 2020-10-15 RX ORDER — FAMOTIDINE 10 MG/ML
20 INJECTION, SOLUTION INTRAVENOUS ONCE
Status: CANCELLED | OUTPATIENT
Start: 2020-10-15 | End: 2020-10-15

## 2020-10-15 RX ORDER — SODIUM CHLORIDE 0.9 % (FLUSH) 0.9 %
10 SYRINGE (ML) INJECTION EVERY 12 HOURS SCHEDULED
Status: CANCELLED | OUTPATIENT
Start: 2020-10-15

## 2020-10-15 RX ORDER — SODIUM CHLORIDE 0.9 % (FLUSH) 0.9 %
10 SYRINGE (ML) INJECTION AS NEEDED
Status: CANCELLED | OUTPATIENT
Start: 2020-10-15

## 2020-10-16 ENCOUNTER — HOSPITAL ENCOUNTER (OUTPATIENT)
Facility: HOSPITAL | Age: 55
Discharge: HOME | End: 2020-10-16
Attending: UROLOGY | Admitting: UROLOGY

## 2020-10-16 ENCOUNTER — ANESTHESIA (OUTPATIENT)
Dept: PERIOP | Facility: HOSPITAL | Age: 55
End: 2020-10-16

## 2020-10-16 VITALS
DIASTOLIC BLOOD PRESSURE: 75 MMHG | OXYGEN SATURATION: 100 % | HEART RATE: 86 BPM | SYSTOLIC BLOOD PRESSURE: 134 MMHG | BODY MASS INDEX: 25.3 KG/M2 | HEIGHT: 61 IN | TEMPERATURE: 98 F | WEIGHT: 134 LBS | RESPIRATION RATE: 16 BRPM

## 2020-10-16 LAB — SARS-COV-2 RDRP RESP QL NAA+PROBE: NOT DETECTED

## 2020-10-16 PROCEDURE — 25010000002 PROPOFOL 10 MG/ML EMULSION: Performed by: NURSE ANESTHETIST, CERTIFIED REGISTERED

## 2020-10-16 PROCEDURE — 25010000002 FENTANYL CITRATE (PF) 100 MCG/2ML SOLUTION: Performed by: NURSE ANESTHETIST, CERTIFIED REGISTERED

## 2020-10-16 PROCEDURE — C2617 STENT, NON-COR, TEM W/O DEL: HCPCS | Performed by: UROLOGY

## 2020-10-16 PROCEDURE — 25010000002 LEVOFLOXACIN PER 250 MG: Performed by: UROLOGY

## 2020-10-16 PROCEDURE — 25010000002 ONDANSETRON PER 1 MG: Performed by: NURSE ANESTHETIST, CERTIFIED REGISTERED

## 2020-10-16 PROCEDURE — 87635 SARS-COV-2 COVID-19 AMP PRB: CPT | Performed by: UROLOGY

## 2020-10-16 PROCEDURE — 25010000002 DEXAMETHASONE PER 1 MG: Performed by: NURSE ANESTHETIST, CERTIFIED REGISTERED

## 2020-10-16 PROCEDURE — C9803 HOPD COVID-19 SPEC COLLECT: HCPCS

## 2020-10-16 PROCEDURE — C1769 GUIDE WIRE: HCPCS | Performed by: UROLOGY

## 2020-10-16 DEVICE — URETERAL STENT
Type: IMPLANTABLE DEVICE | Site: URETER | Status: FUNCTIONAL
Brand: PERCUFLEX™ PLUS

## 2020-10-16 RX ORDER — FENTANYL CITRATE 50 UG/ML
50 INJECTION, SOLUTION INTRAMUSCULAR; INTRAVENOUS
Status: DISCONTINUED | OUTPATIENT
Start: 2020-10-16 | End: 2020-10-21 | Stop reason: HOSPADM

## 2020-10-16 RX ORDER — SODIUM CHLORIDE, SODIUM LACTATE, POTASSIUM CHLORIDE, CALCIUM CHLORIDE 600; 310; 30; 20 MG/100ML; MG/100ML; MG/100ML; MG/100ML
9 INJECTION, SOLUTION INTRAVENOUS CONTINUOUS
Status: DISCONTINUED | OUTPATIENT
Start: 2020-10-16 | End: 2020-10-21 | Stop reason: HOSPADM

## 2020-10-16 RX ORDER — MAGNESIUM HYDROXIDE 1200 MG/15ML
LIQUID ORAL AS NEEDED
Status: DISCONTINUED | OUTPATIENT
Start: 2020-10-16 | End: 2020-10-16 | Stop reason: HOSPADM

## 2020-10-16 RX ORDER — LIDOCAINE HYDROCHLORIDE 10 MG/ML
INJECTION, SOLUTION EPIDURAL; INFILTRATION; INTRACAUDAL; PERINEURAL AS NEEDED
Status: DISCONTINUED | OUTPATIENT
Start: 2020-10-16 | End: 2020-10-16 | Stop reason: SURG

## 2020-10-16 RX ORDER — ONDANSETRON 2 MG/ML
INJECTION INTRAMUSCULAR; INTRAVENOUS AS NEEDED
Status: DISCONTINUED | OUTPATIENT
Start: 2020-10-16 | End: 2020-10-16 | Stop reason: SURG

## 2020-10-16 RX ORDER — LIDOCAINE HYDROCHLORIDE 10 MG/ML
0.5 INJECTION, SOLUTION EPIDURAL; INFILTRATION; INTRACAUDAL; PERINEURAL ONCE AS NEEDED
Status: COMPLETED | OUTPATIENT
Start: 2020-10-16 | End: 2020-10-16

## 2020-10-16 RX ORDER — DEXAMETHASONE SODIUM PHOSPHATE 4 MG/ML
INJECTION, SOLUTION INTRA-ARTICULAR; INTRALESIONAL; INTRAMUSCULAR; INTRAVENOUS; SOFT TISSUE AS NEEDED
Status: DISCONTINUED | OUTPATIENT
Start: 2020-10-16 | End: 2020-10-16 | Stop reason: SURG

## 2020-10-16 RX ORDER — SCOLOPAMINE TRANSDERMAL SYSTEM 1 MG/1
1 PATCH, EXTENDED RELEASE TRANSDERMAL ONCE
Status: COMPLETED | OUTPATIENT
Start: 2020-10-16 | End: 2020-10-19

## 2020-10-16 RX ORDER — HYDROMORPHONE HYDROCHLORIDE 1 MG/ML
0.5 INJECTION, SOLUTION INTRAMUSCULAR; INTRAVENOUS; SUBCUTANEOUS
Status: DISCONTINUED | OUTPATIENT
Start: 2020-10-16 | End: 2020-10-21 | Stop reason: HOSPADM

## 2020-10-16 RX ORDER — FENTANYL CITRATE 50 UG/ML
INJECTION, SOLUTION INTRAMUSCULAR; INTRAVENOUS AS NEEDED
Status: DISCONTINUED | OUTPATIENT
Start: 2020-10-16 | End: 2020-10-16 | Stop reason: SURG

## 2020-10-16 RX ORDER — FAMOTIDINE 20 MG/1
20 TABLET, FILM COATED ORAL ONCE
Status: COMPLETED | OUTPATIENT
Start: 2020-10-16 | End: 2020-10-16

## 2020-10-16 RX ORDER — LEVOFLOXACIN 5 MG/ML
500 INJECTION, SOLUTION INTRAVENOUS EVERY 24 HOURS
Status: COMPLETED | OUTPATIENT
Start: 2020-10-16 | End: 2020-10-16

## 2020-10-16 RX ORDER — PROPOFOL 10 MG/ML
VIAL (ML) INTRAVENOUS AS NEEDED
Status: DISCONTINUED | OUTPATIENT
Start: 2020-10-16 | End: 2020-10-16 | Stop reason: SURG

## 2020-10-16 RX ADMIN — SCOPALAMINE 1 PATCH: 1 PATCH, EXTENDED RELEASE TRANSDERMAL at 13:01

## 2020-10-16 RX ADMIN — PROPOFOL 200 MG: 10 INJECTION, EMULSION INTRAVENOUS at 14:34

## 2020-10-16 RX ADMIN — LEVOFLOXACIN 500 MG: 5 INJECTION, SOLUTION INTRAVENOUS at 14:31

## 2020-10-16 RX ADMIN — FENTANYL CITRATE 100 MCG: 50 INJECTION, SOLUTION INTRAMUSCULAR; INTRAVENOUS at 14:34

## 2020-10-16 RX ADMIN — EPHEDRINE SULFATE 10 MG: 50 INJECTION INTRAMUSCULAR; INTRAVENOUS; SUBCUTANEOUS at 14:39

## 2020-10-16 RX ADMIN — SODIUM CHLORIDE, POTASSIUM CHLORIDE, SODIUM LACTATE AND CALCIUM CHLORIDE 9 ML/HR: 600; 310; 30; 20 INJECTION, SOLUTION INTRAVENOUS at 11:55

## 2020-10-16 RX ADMIN — FAMOTIDINE 20 MG: 20 TABLET, FILM COATED ORAL at 12:09

## 2020-10-16 RX ADMIN — DEXAMETHASONE SODIUM PHOSPHATE 8 MG: 4 INJECTION, SOLUTION INTRAMUSCULAR; INTRAVENOUS at 14:38

## 2020-10-16 RX ADMIN — EPHEDRINE SULFATE 10 MG: 50 INJECTION INTRAMUSCULAR; INTRAVENOUS; SUBCUTANEOUS at 14:58

## 2020-10-16 RX ADMIN — LIDOCAINE HYDROCHLORIDE 50 MG: 10 INJECTION, SOLUTION EPIDURAL; INFILTRATION; INTRACAUDAL; PERINEURAL at 14:34

## 2020-10-16 RX ADMIN — LIDOCAINE HYDROCHLORIDE 0.2 ML: 10 INJECTION, SOLUTION EPIDURAL; INFILTRATION; INTRACAUDAL; PERINEURAL at 11:55

## 2020-10-16 RX ADMIN — ONDANSETRON 4 MG: 2 INJECTION INTRAMUSCULAR; INTRAVENOUS at 14:38

## 2020-10-16 NOTE — ANESTHESIA PROCEDURE NOTES
Airway  Urgency: elective    Date/Time: 10/16/2020 2:35 PM  Airway not difficult    General Information and Staff    Patient location during procedure: OR  CRNA: Ankur Bailey CRNA    Indications and Patient Condition  Indications for airway management: airway protection    Preoxygenated: yes  MILS not maintained throughout  Mask difficulty assessment: 1 - vent by mask    Final Airway Details  Final airway type: supraglottic airway      Successful airway: I-gel  Size 4    Number of attempts at approach: 1  Assessment: lips, teeth, and gum same as pre-op and atraumatic intubation    Additional Comments  Negative epigastric sounds, Breath sound equal bilaterally with symmetric chest rise and fall

## 2020-10-16 NOTE — OP NOTE
Operative Note    Name: Lissa Cheng   Age:55 y.o.   Sex: female  MRN: [id number]    DATE OF PROCEDURE: 10/16/2020    PREOPERATIVE DIAGNOSIS:    Right kidney stone    POSTOPERATIVE DIAGNOSIS: Right kidney stone    PROCEDURE PERFORMED:    1.  Cystoscopy with right indwelling ureteral stent placement.  2.  Fluoroscopy, supervision and interpretation.  3.  Right extracorporeal shockwave lithotripsy (ESWL).    SURGEON: Hermilo Marquez MD    ASSISTANT:   None    ANESTHESIA:  General    SPECIMEN:   None    FINDINGS:   Normal-appearing urethra and bladder.  Proper placement of the right stent is noted on fluoroscopy.  Right upper pole stone 7 x 5 mm which appeared to fragment well.    INDICATIONS FOR PROCEDURE: 55-year-old female with a history of recurrent urolithiasis.  She underwent previous ESWL twice.  A recent CT scan followed by KUB showed a 7 x 5 mm right mid to upper pole stone.  She opts for ESWL again.    DRAINS: Right indwelling ureteral stent 6 [place] by 24 cm, strings intact.    DESCRIPTION OF PROCEDURE: After informed consent, the patient was taken to the operating room in stable condition.  General anesthesia was induced without complications.  She was placed in a dorsolithotomy position.  The genitalia and groins were prepped and draped in the usual fashion.  A timeout was taken to identify the correct patient and procedure and side.  The previous KUB and CT scan were reviewed.  The stone was visible on fluoroscopy in the mid to upper pole of the right kidney.  A 21 [place] cystoscope was placed per urethra into the bladder.  Inspection revealed a normal bladder.  A sensor wire was inserted into the right ureter and advanced up to the level of the kidney under fluoroscopy guidance without difficulty.  A 6 [place] by 24 cm double-J indwelling ureteral stent was passed over the wire and up to the level of the kidney under fluoroscopy guidance.  The wire was removed.  There was adequate curl in the  kidney and in the bladder.  She was placed in a supine position.  The stone was targeted with fluoroscopy.  Shockwave lithotripsy was applied for a total of 3000 shocks up to power level 5 at a rate of 2 Hz.  The stone appeared to fragment well.  She was awakened from anesthesia and taken recovery room in satisfactory condition.    DISPOSITION:   The patient will be discharged to home in the care of her sister when she is stable.  She will be provided prescriptions for Norco, Zofran, and tamsulosin.  She is instructed to remove the stent and strings in 3 days on Monday morning.  She will follow-up with me in 2 to 3 weeks.    Hermilo Marquez MD  -  10/16/2020, 15:35 EDT

## 2020-10-16 NOTE — H&P
Patient Care Team:      Chief complaint: right kidney stone    Subjective:  Patient is a 55 y.o.female presents with a 5-6 month history of right kidney stone.  She denies any recent changes in right sided pain .  No hematuria.  No recent changes in her general health.    Review of Systems:  General ROS: negative  Cardiovascular ROS: no chest pain or dyspnea on exertion  Respiratory ROS: no cough, shortness of breath, or wheezing      Allergies:   Allergies   Allergen Reactions   • Bacitracin-Polymyxin B Other (See Comments)     Patient states that whenever she applies this medication, the skin lesion gets worse/infected.          Latex: no  Contrast Dye: no    Home Meds    Medications Prior to Admission   Medication Sig Dispense Refill Last Dose   • calcium carbonate (OS-THAO) 1250 (500 Ca) MG tablet Take 1 tablet by mouth 2 (Two) Times a Day.   10/15/2020 at Unknown time   • Cranberry (THERACRAN ONE PO) Take 1 tablet by mouth Daily.   10/15/2020 at Unknown time   • dexlansoprazole (DEXILANT) 60 MG capsule Take 60 mg by mouth Daily.   10/16/2020 at Unknown time   • magnesium oxide (MAGOX) 400 (241.3 MG) MG tablet tablet Take 400 mg by mouth Daily.   10/15/2020 at Unknown time   • Menatetrenone (VITAMIN K2) 100 MCG tablet Take 1 tablet by mouth Daily.   10/15/2020 at Unknown time   • metroNIDAZOLE (METROCREAM) 0.75 % cream Apply 1 application topically to the appropriate area as directed 2 (Two) Times a Day.   10/16/2020 at Unknown time   • Multiple Vitamins-Minerals (MULTIVITAMIN WITH MINERALS) tablet tablet Take 1 tablet by mouth Daily.   10/15/2020 at Unknown time   • raloxifene (EVISTA) 60 MG tablet Take 60 mg by mouth Daily.   10/15/2020 at Unknown time   • vitamin B-12 (CYANOCOBALAMIN) 100 MCG tablet Take 50 mcg by mouth Daily.   10/15/2020 at Unknown time   • vitamin C (ASCORBIC ACID) 250 MG tablet Take 250 mg by mouth Daily.   10/15/2020 at Unknown time   • Vitamin D, Cholecalciferol, (CHOLECALCIFEROL) 400  "UNITS tablet Take 400 Units by mouth Daily.   10/15/2020 at Unknown time   • denosumab (PROLIA) 60 MG/ML solution syringe Inject 1 mL under the skin into the appropriate area as directed Every 6 (Six) Months.   8/31/2020   • escitalopram (LEXAPRO) 10 MG tablet Take 1 tablet by mouth Daily.  0    • Nutritional Supplements (THERALITH XR) tablet Take  by mouth.        PMH:   Past Medical History:   Diagnosis Date   • Acid reflux    • Anxiety    • Arthritis    • Fibrocystic breast    • History of kidney stones    • Irritability    • Menopausal symptoms    • Mood swings    • Osteopenia    • Osteoporosis    • Ovarian cyst     right   • Pain     Pain/discomfort   • Pelvic pain    • PONV (postoperative nausea and vomiting)    • Uterine prolapse    • Vaginal irritation      PSH:    Past Surgical History:   Procedure Laterality Date   • BASAL CELL CARCINOMA EXCISION     • BREAST BIOPSY Left 12/18/2007   • CYSTOSCOPY W/ LASER LITHOTRIPSY      X2   • HYSTERECTOMY  06/21/1999   • WISDOM TOOTH EXTRACTION       Immunization History: pneumo: no  Flu: no  Tetanus: yes  Social History:   Tobacco: no   Alcohol: no      Physical Exam:/81 (BP Location: Right arm, Patient Position: Sitting)   Pulse 79   Temp 97.6 °F (36.4 °C) (Temporal)   Resp 18   Ht 154.9 cm (61\")   Wt 60.8 kg (134 lb)   SpO2 99%   Breastfeeding No   BMI 25.32 kg/m²       General Appearance:    Alert, cooperative, no distress, appears stated age   Head:    Normocephalic, without obvious abnormality, atraumatic   Lungs:     Clear to auscultation bilaterally, respirations unlabored    Heart: Regular rate and rhythm, S1 and S2 normal, no murmur, rub    or gallop    Abdomen:    Soft without tenderness   Breast Exam:    deferred   Genitalia:    deferred   Extremities:   Extremities normal, atraumatic, no cyanosis or edema   Skin:   Skin color, texture, turgor normal, no rashes or lesions   Neurologic:   Grossly intact     Results Review: " covid-neg     Impression: Right kidney stone    Plan: For cystoscopy, right stent, right extracorporeal shockwave lithotripsy today  KIMBERLY Aaron 10/16/2020 12:50 EDT

## 2020-10-16 NOTE — ANESTHESIA PREPROCEDURE EVALUATION
Anesthesia Evaluation     Patient summary reviewed and Nursing notes reviewed                Airway   Mallampati: II  Dental      Pulmonary - negative pulmonary ROS   Cardiovascular - negative cardio ROS        Neuro/Psych- negative ROS  GI/Hepatic/Renal/Endo - negative ROS     Musculoskeletal (-) negative ROS    Abdominal    Substance History - negative use     OB/GYN negative ob/gyn ROS         Other                        Anesthesia Plan    ASA 2     general     intravenous induction     Anesthetic plan, all risks, benefits, and alternatives have been provided, discussed and informed consent has been obtained with: patient.    Plan discussed with CRNA.

## 2020-10-16 NOTE — ANESTHESIA POSTPROCEDURE EVALUATION
Patient: Lissa Cheng    Procedure Summary     Date: 10/16/20 Room / Location:  [facility] [address] [place] [address] [facility]    Anesthesia Start: 1431 Anesthesia Stop: 1542    Procedure: CYSTOSCOPY RIGHT STENT, RIGHT EXTRACORPOREAL SHOCKWAVE LITHOTRIPSY (Right ) Diagnosis:     Surgeon: Hermilo Marquez MD Provider: Osman Whitney MD    Anesthesia Type: general ASA Status: 2          Anesthesia Type: general    Vitals  Vitals Value Taken Time   /58 10/16/20 1532   Temp 97.2 °F (36.2 °C) 10/16/20 1532   Pulse 79 10/16/20 1541   Resp 15 10/16/20 1540   SpO2 92 % 10/16/20 1541   Vitals shown include unvalidated device data.        Post Anesthesia Care and Evaluation    Patient location during evaluation: PACU  Patient participation: complete - patient participated  Level of consciousness: awake and alert  Pain score: 0  Pain management: adequate  Airway patency: patent  Anesthetic complications: No anesthetic complications  PONV Status: none  Cardiovascular status: hemodynamically stable and acceptable  Respiratory status: nonlabored ventilation, acceptable and nasal cannula  Hydration status: acceptable

## 2020-10-17 PROBLEM — N20.0 KIDNEY STONE: Status: ACTIVE | Noted: 2020-10-17

## 2020-12-14 ENCOUNTER — OFFICE VISIT (OUTPATIENT)
Dept: OBSTETRICS AND GYNECOLOGY | Facility: CLINIC | Age: 55
End: 2020-12-14

## 2020-12-14 VITALS
DIASTOLIC BLOOD PRESSURE: 88 MMHG | SYSTOLIC BLOOD PRESSURE: 132 MMHG | HEIGHT: 61 IN | BODY MASS INDEX: 25.34 KG/M2 | WEIGHT: 134.2 LBS

## 2020-12-14 DIAGNOSIS — N60.12 FIBROCYSTIC BREAST CHANGES, BILATERAL: ICD-10-CM

## 2020-12-14 DIAGNOSIS — N60.11 FIBROCYSTIC BREAST CHANGES, BILATERAL: ICD-10-CM

## 2020-12-14 DIAGNOSIS — Z01.419 ENCOUNTER FOR GYNECOLOGICAL EXAMINATION WITHOUT ABNORMAL FINDING: ICD-10-CM

## 2020-12-14 DIAGNOSIS — Z78.0 MENOPAUSE: Primary | ICD-10-CM

## 2020-12-14 PROCEDURE — 99396 PREV VISIT EST AGE 40-64: CPT | Performed by: OBSTETRICS & GYNECOLOGY

## 2020-12-14 NOTE — PROGRESS NOTES
Chief Complaint   Patient presents with   • Annual Exam       Lissa Cheng is a 55 y.o. year old  presenting to be seen for her annual exam.  This patient has previously had a vaginal hysterectomy/anterior repair by Dr. Shane Newell.  She does not use estrogen replacement therapy.  She has a history of nephrolithiasis and underwent lithotripsy in 2020.  She denies hematuria.  She denies bowel symptoms.    SCREENING TESTS    Year 2012   Age                         PAP    Neg.                     HPV high risk                         Mammogram        benign benign                JULIO CÉSAR score                         Breast MRI                         Lipids                         Vitamin D                         Colonoscopy                         DEXA  Frax (hip/any)                         Ovarian Screen                             She exercises regularly: yes.  She wears her seat belt: yes.  She has concerns about domestic violence: no.  She has noticed changes in height: no    GYN screening history:  · Last mammogram: was done on approximately 3/18/2020 and the result was: Birads II (Benign findings)..    No Additional Complaints Reported    The following portions of the patient's history were reviewed and updated as appropriate:vital signs and   She  has a past medical history of Acid reflux, Anxiety, Arthritis, Fibrocystic breast, History of kidney stones, Irritability, Menopausal symptoms, Menopause, Mood swings, Osteopenia, Osteoporosis, Ovarian cyst, Pain, Pelvic pain, PONV (postoperative nausea and vomiting), Uterine prolapse, and Vaginal irritation.  She does not have any pertinent problems on file.  She  has a past surgical history that includes Excision basal cell carcinoma; Tucker tooth extraction; Breast biopsy (Left, 2007); Cystoscopy w/ laser lithotripsy; Hysterectomy  (06/21/1999); and extracorporeal shockwave lithotripsy (eswl), stent insertion/removal (Right, 10/16/2020).  Her family history includes Breast cancer in her mother, sister, and another family member; Cancer in her mother, sister, and sister; Colon cancer in her sister and another family member; Heart attack in her father.  She  reports that she has never smoked. She has never used smokeless tobacco. She reports that she does not drink alcohol or use drugs.  Current Outpatient Medications   Medication Sig Dispense Refill   • calcium carbonate (OS-THAO) 1250 (500 Ca) MG tablet Take 1 tablet by mouth 2 (Two) Times a Day.     • Cranberry (THERACRAN ONE PO) Take 1 tablet by mouth Daily.     • denosumab (PROLIA) 60 MG/ML solution syringe Inject 1 mL under the skin into the appropriate area as directed Every 6 (Six) Months.     • dexlansoprazole (DEXILANT) 60 MG capsule Take 60 mg by mouth Daily.     • magnesium oxide (MAGOX) 400 (241.3 MG) MG tablet tablet Take 400 mg by mouth Daily.     • Menatetrenone (VITAMIN K2) 100 MCG tablet Take 1 tablet by mouth Daily.     • metroNIDAZOLE (METROCREAM) 0.75 % cream Apply 1 application topically to the appropriate area as directed 2 (Two) Times a Day.     • Multiple Vitamins-Minerals (MULTIVITAMIN WITH MINERALS) tablet tablet Take 1 tablet by mouth Daily.     • Nutritional Supplements (THERALITH XR) tablet Take  by mouth.     • raloxifene (EVISTA) 60 MG tablet Take 60 mg by mouth Daily.     • vitamin B-12 (CYANOCOBALAMIN) 100 MCG tablet Take 50 mcg by mouth Daily.     • vitamin C (ASCORBIC ACID) 250 MG tablet Take 250 mg by mouth Daily.     • Vitamin D, Cholecalciferol, (CHOLECALCIFEROL) 400 UNITS tablet Take 400 Units by mouth Daily.       No current facility-administered medications for this visit.      She is allergic to bacitracin-polymyxin b..    Review of Systems  A review of systems was taken.  She denies cough, fever, shortness of breath, and loss of her sense of taste or  "smell  Constitutional: negative for fever, chills, activity change, appetite change, fatigue and unexpected weight change.  Respiratory: negative  Cardiovascular: negative  Gastrointestinal: negative  Genitourinary:negative  Musculoskeletal:negative  Behavioral/Psych: negative     Counseling/Anticipatory Guidance Discussed: nutrition, physical activity, healthy weight, screenings, self-breast exam and renal health    /88   Ht 154.9 cm (61\")   Wt 60.9 kg (134 lb 3.2 oz)   Breastfeeding No   BMI 25.36 kg/m²     MEDICALLY INDICATED   Physical Exam    General:  alert; cooperative; well developed; well nourished   Skin:  No suspicious lesions seen   Thyroid: normal to inspection and palpation   Lungs:  breathing is unlabored  clear to auscultation bilaterally   Heart:  regular rate and rhythm, S1, S2 normal, no murmur, click, rub or gallop  normal apical impulse   Breasts:  Examined in supine position  Symmetric without masses or skin dimpling  Nipples normal without inversion, lesions or discharge  There are no palpable axillary nodes  Fibrocystic changes are present both breasts without a discrete mass   Abdomen: soft, non-tender; no masses  no umbilical or inguinal hernias are present  no hepato-splenomegaly   Pelvis: Clinical staff was present for exam  External genitalia:  normal appearance of the external genitalia including Bartholin's and Herald's glands.  Vaginal:  normal pink mucosa without prolapse or lesions.  Cervix:  absent.  Uterus:  absent.  Adnexa:  non palpable bilaterally.  Rectal:  anus visually normal appearing. recto-vaginal exam unremarkable and confirms findings;     Lab Review   No data reviewed    Imaging  Mammogram results        Advance directives- NO (the patient was encouraged to establish advanced directives)      ASSESSMENT  Problems Addressed this Visit        Genitourinary    Menopause - Primary       Other    Fibrocystic breast changes, bilateral      Other Visit Diagnoses     " Encounter for gynecological examination without abnormal finding        Relevant Orders    Liquid-based Pap Smear, Screening      Diagnoses       Codes Comments    Menopause    -  Primary ICD-10-CM: Z78.0  ICD-9-CM: 627.2     Fibrocystic breast changes, bilateral     ICD-10-CM: N60.11, N60.12  ICD-9-CM: 610.1     Encounter for gynecological examination without abnormal finding     ICD-10-CM: Z01.419  ICD-9-CM: V72.31               Substance History:   reports that she has never smoked. She has never used smokeless tobacco.   reports no history of alcohol use.   reports no history of drug use.    Substance use counseling is not indicated based on patient history.      PLAN    · Medications prescribed this encounter:  No orders of the defined types were placed in this encounter.  · Monthly self breast assessment and annual breast imaging  · Calcium, 600 mg/ Vit. D, 400 IU daily; regular weight-bearing exercise  · Follow up: 12 month(s)  *Please note that portions of this documentation may have been completed with a voice recognition program.  Efforts were made to edit this dictation, but occasional words may have been mistranscribed.       This note was electronically signed.    GORDY Lares MD  December 14, 2020  11:35 EST

## 2021-02-16 ENCOUNTER — TELEMEDICINE (OUTPATIENT)
Dept: GASTROENTEROLOGY | Facility: CLINIC | Age: 56
End: 2021-02-16

## 2021-02-16 DIAGNOSIS — K21.9 GASTROESOPHAGEAL REFLUX DISEASE WITHOUT ESOPHAGITIS: Primary | ICD-10-CM

## 2021-02-16 DIAGNOSIS — R13.19 ESOPHAGEAL DYSPHAGIA: ICD-10-CM

## 2021-02-16 DIAGNOSIS — K58.0 IRRITABLE BOWEL SYNDROME WITH DIARRHEA: ICD-10-CM

## 2021-02-16 PROCEDURE — 99204 OFFICE O/P NEW MOD 45 MIN: CPT | Performed by: INTERNAL MEDICINE

## 2021-02-16 NOTE — PROGRESS NOTES
GASTROENTEROLOGY TELEMEDICINE OFFICE NOTE  Lissa Cheng  [id number]  1965    CARE TEAM  Patient Care Team:  Juan David Hanley DO as PCP - General (Family Medicine)  Boni Lombardi MD as Referring Physician (Orthopedic Surgery)  Massimo Choudhary MD as Referring Physician (Rheumatology)  Antonio Lares MD as Consulting Physician (Gynecology)    No ref. provider found     Chief Complaint   Patient presents with   • Change in bowel habits   • Chronic diarrhea   • FHx Colon Cancer        HISTORY OF PRESENT ILLNESS:  Patient known to me.  55-year-old white female with history of dysphagia to solids requiring esophageal dilation.  No evidence of Allen's esophagus on her 2018 EGD.  Last colonoscopy also in 2018 was unremarkable.  She has a family history remarkable for sister with colon cancer.    Complaints started about a year ago when she started noticing a deviation from her usual bowel habits characterized by single well-formed bowel movement per day progressing to multiple urgent bowel movements per day which are noted to have flared up some internal hemorrhoids and had some minimal rectal bleeding many many months ago which has since resolved but she continues have problems with multiple frequent urgent bowel meds per day with some element of nocturnal awakening.  Fortunately she denies any problems with dysphagia solids or odynophagia she is noted no early satiety nor any unexplained weight loss she has noted no problems with melena or bright red blood per rectum and denies any problems with constipation.  She is not had any particular changes in her usual medications and is very concerned about the ongoing and persistent change in her bowel habits.  Fortunately due to Covid she has been at home more than usual but feels that if she was going about her usual daily activities she would be unable to do so because of her diarrhea.    She presents today for further work-up and  evaluation.    PAST MEDICAL HISTORY  Past Medical History:   Diagnosis Date   • Acid reflux    • Anxiety    • Arthritis    • Fibrocystic breast    • History of kidney stones    • Irritability    • Menopausal symptoms    • Menopause    • Mood swings    • Osteopenia    • Osteoporosis    • Ovarian cyst     right   • Pain     Pain/discomfort   • Pelvic pain    • PONV (postoperative nausea and vomiting)    • Uterine prolapse    • Vaginal irritation         PAST SURGICAL HISTORY  Past Surgical History:   Procedure Laterality Date   • BASAL CELL CARCINOMA EXCISION     • BREAST BIOPSY Left 12/18/2007   • CYSTOSCOPY W/ LASER LITHOTRIPSY      X2   • EXTRACORPOREAL SHOCKWAVE LITHOTRIPSY (ESWL), STENT INSERTION/REMOVAL Right 10/16/2020    Procedure: CYSTOSCOPY RIGHT STENT, RIGHT EXTRACORPOREAL SHOCKWAVE LITHOTRIPSY;  Surgeon: Hermilo Marquez MD;  Location: [facility];  Service: Urology;  Laterality: Right;   • HYSTERECTOMY  06/21/1999   • WISDOM TOOTH EXTRACTION          MEDICATIONS:    Current Outpatient Medications:   •  calcium carbonate (OS-THAO) 1250 (500 Ca) MG tablet, Take 1 tablet by mouth 2 (Two) Times a Day., Disp: , Rfl:   •  Cranberry (THERACRAN ONE PO), Take 1 tablet by mouth Daily., Disp: , Rfl:   •  denosumab (PROLIA) 60 MG/ML solution syringe, Inject 1 mL under the skin into the appropriate area as directed Every 6 (Six) Months., Disp: , Rfl:   •  dexlansoprazole (DEXILANT) 60 MG capsule, Take 60 mg by mouth Daily., Disp: , Rfl:   •  magnesium oxide (MAGOX) 400 (241.3 MG) MG tablet tablet, Take 400 mg by mouth Daily., Disp: , Rfl:   •  Menatetrenone (VITAMIN K2) 100 MCG tablet, Take 1 tablet by mouth Daily., Disp: , Rfl:   •  metroNIDAZOLE (METROCREAM) 0.75 % cream, Apply 1 application topically to the appropriate area as directed 2 (Two) Times a Day., Disp: , Rfl:   •  Multiple Vitamins-Minerals (MULTIVITAMIN WITH MINERALS) tablet tablet, Take 1 tablet by mouth Daily., Disp: , Rfl:   •  Nutritional  Supplements (THERALITH XR) tablet, Take  by mouth., Disp: , Rfl:   •  raloxifene (EVISTA) 60 MG tablet, Take 60 mg by mouth Daily., Disp: , Rfl:   •  vitamin B-12 (CYANOCOBALAMIN) 100 MCG tablet, Take 50 mcg by mouth Daily., Disp: , Rfl:   •  vitamin C (ASCORBIC ACID) 250 MG tablet, Take 250 mg by mouth Daily., Disp: , Rfl:   •  Vitamin D, Cholecalciferol, (CHOLECALCIFEROL) 400 UNITS tablet, Take 400 Units by mouth Daily., Disp: , Rfl:     ALLERGIES  Allergies   Allergen Reactions   • Bacitracin-Polymyxin B Other (See Comments)     Patient states that whenever she applies this medication, the skin lesion gets worse/infected.       FAMILY HISTORY:  Family History   Problem Relation Age of Onset   • Cancer Mother    • Breast cancer Mother    • Heart attack Father    • Cancer Sister    • Breast cancer Sister    • Cancer Sister    • Colon cancer Sister    • Breast cancer Other    • Colon cancer Other        SOCIAL HISTORY  Social History     Socioeconomic History   • Marital status:      Spouse name: Not on file   • Number of children: Not on file   • Years of education: Not on file   • Highest education level: Not on file   Tobacco Use   • Smoking status: Never Smoker   • Smokeless tobacco: Never Used   Substance and Sexual Activity   • Alcohol use: No   • Drug use: No   • Sexual activity: Defer     Birth control/protection: Post-menopausal, Surgical     Socioeconomic History:  She is a teacher/ communication.  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  Non-smoker.  Nondrinker.       REVIEW OF SYSTEMS  Review of Systems   Constitutional: Negative for activity change, appetite change, chills, diaphoresis, fatigue, fever, unexpected weight gain and unexpected weight loss.   Gastrointestinal: Positive for abdominal pain, diarrhea and GERD. Negative for abdominal distention, anal bleeding, blood in stool, constipation, nausea, rectal pain, vomiting and indigestion.     I reviewed the above-noted review of  systems    PHYSICAL EXAM   General: Pleasant female in no apparent or acute distress  HEENT: Grossly anicteric sclera.  Wearing glasses  Neck: Grossly normal neck motion without observed rigidity  Lungs: Grossly normal respirations without labored breathing or audible wheezing.  Speaking in full sentences  Neurologic: Normal cognition.  Normal affect.  Alert and oriented.  Normal speech.        ASSESSMENT  1.-Chronic diarrhea of unclear etiology.  Rule out microscopic colitis, inflammatory bowel disease, diarrhea predominant irritable bowel syndrome  2.-Family should colon cancer  3.-History of dysphagia to solids resolved with esophageal dilation in 2018.  Minimal dysphagia to solids noted at this time.  4.-Chronic GERD    PLAN  1.-EGD is recommended for exclusion of celiac disease and colonoscopy for exclusion of inflammatory bowel disease and microscopic colitis prior to empiric therapy/therapy for diarrhea predominant irritable bowel syndrome.      Ayush Ga MD  2/16/2021   15:50 EST    .VIDEOCONSENT  The use of a video visit has been reviewed with the patient and verbal informed consent have been obtained.

## 2021-02-18 RX ORDER — SODIUM, POTASSIUM,MAG SULFATES 17.5-3.13G
2 SOLUTION, RECONSTITUTED, ORAL ORAL TAKE AS DIRECTED
Qty: 354 ML | Refills: 0 | Status: SHIPPED | OUTPATIENT
Start: 2021-02-18 | End: 2021-08-10

## 2021-02-28 ENCOUNTER — APPOINTMENT (OUTPATIENT)
Dept: PREADMISSION TESTING | Facility: HOSPITAL | Age: 56
End: 2021-02-28

## 2021-02-28 LAB — SARS-COV-2 RNA RESP QL NAA+PROBE: NOT DETECTED

## 2021-02-28 PROCEDURE — C9803 HOPD COVID-19 SPEC COLLECT: HCPCS

## 2021-02-28 PROCEDURE — U0004 COV-19 TEST NON-CDC HGH THRU: HCPCS

## 2021-03-03 ENCOUNTER — OUTSIDE FACILITY SERVICE (OUTPATIENT)
Dept: GASTROENTEROLOGY | Facility: CLINIC | Age: 56
End: 2021-03-03

## 2021-03-03 PROCEDURE — 45380 COLONOSCOPY AND BIOPSY: CPT | Performed by: INTERNAL MEDICINE

## 2021-03-03 PROCEDURE — 43239 EGD BIOPSY SINGLE/MULTIPLE: CPT | Performed by: INTERNAL MEDICINE

## 2021-03-11 ENCOUNTER — TELEPHONE (OUTPATIENT)
Dept: GASTROENTEROLOGY | Facility: CLINIC | Age: 56
End: 2021-03-11

## 2021-03-11 RX ORDER — MONTELUKAST SODIUM 4 MG/1
2 TABLET, CHEWABLE ORAL 2 TIMES DAILY
Qty: 120 TABLET | Refills: 4 | Status: SHIPPED | OUTPATIENT
Start: 2021-03-11 | End: 2021-08-10

## 2021-03-11 NOTE — TELEPHONE ENCOUNTER
Called patient and notified her of  recommendations . Per  patient was instructed to keep Colestipol two hours apart from other medications and titrate to response.  Confirmed she understood and had no additional questions.

## 2021-03-11 NOTE — TELEPHONE ENCOUNTER
----- Message from Ayush Ga MD sent at 3/11/2021  7:24 AM EST -----  See my letter to patient.  She will see if she is still having diarrhea that is the case to start her on Colestid and follow-up with her after that.

## 2021-05-06 ENCOUNTER — OFFICE VISIT (OUTPATIENT)
Dept: GASTROENTEROLOGY | Facility: CLINIC | Age: 56
End: 2021-05-06

## 2021-05-06 VITALS
HEART RATE: 85 BPM | SYSTOLIC BLOOD PRESSURE: 137 MMHG | WEIGHT: 126.2 LBS | TEMPERATURE: 98.4 F | DIASTOLIC BLOOD PRESSURE: 69 MMHG | BODY MASS INDEX: 23.85 KG/M2

## 2021-05-06 DIAGNOSIS — K58.0 IRRITABLE BOWEL SYNDROME WITH DIARRHEA: ICD-10-CM

## 2021-05-06 DIAGNOSIS — R13.19 ESOPHAGEAL DYSPHAGIA: ICD-10-CM

## 2021-05-06 DIAGNOSIS — K21.9 GASTROESOPHAGEAL REFLUX DISEASE WITHOUT ESOPHAGITIS: Primary | ICD-10-CM

## 2021-05-06 PROCEDURE — 99213 OFFICE O/P EST LOW 20 MIN: CPT | Performed by: INTERNAL MEDICINE

## 2021-05-06 RX ORDER — ZINC SULFATE 66 MG
TABLET ORAL
COMMUNITY

## 2021-05-06 NOTE — PROGRESS NOTES
"GASTROENTEROLOGY OFFICE NOTE  Lissa Cheng  [id number]  1965      Chief Complaint   Patient presents with   • Follow-up     Post EGD and Colonoscopy    • Diarrhea        HISTORY OF PRESENT ILLNESS:  Patient presents for follow-up for further evaluation of chronic diarrhea.    EGD colonoscopy negative for microscopic lites, inflammatory bowel disease or celiac disease.    Colestid 1 g tablet per day was ineffective and she does not really want to continue taking it she feels it is just \"covering up \"perhaps some dietary elements.  She believes that perhaps dairy is involved with her symptoms and cutting back has been somewhat helpful.    PAST MEDICAL HISTORY  Past Medical History:   Diagnosis Date   • Acid reflux    • Anxiety    • Arthritis    • Fibrocystic breast    • History of kidney stones    • Irritability    • Menopausal symptoms    • Menopause    • Mood swings    • Osteopenia    • Osteoporosis    • Ovarian cyst     right   • Pain     Pain/discomfort   • Pelvic pain    • PONV (postoperative nausea and vomiting)    • Uterine prolapse    • Vaginal irritation         PAST SURGICAL HISTORY  Past Surgical History:   Procedure Laterality Date   • BASAL CELL CARCINOMA EXCISION     • BREAST BIOPSY Left 12/18/2007   • COLONOSCOPY     • CYSTOSCOPY W/ LASER LITHOTRIPSY      X2   • ENDOSCOPY     • EXTRACORPOREAL SHOCKWAVE LITHOTRIPSY (ESWL), STENT INSERTION/REMOVAL Right 10/16/2020    Procedure: CYSTOSCOPY RIGHT STENT, RIGHT EXTRACORPOREAL SHOCKWAVE LITHOTRIPSY;  Surgeon: Hermilo Marquez MD;  Location: [facility];  Service: Urology;  Laterality: Right;   • HYSTERECTOMY  06/21/1999   • WISDOM TOOTH EXTRACTION          MEDICATIONS:    Current Outpatient Medications:   •  calcium carbonate (OS-THAO) 1250 (500 Ca) MG tablet, Take 1 tablet by mouth 2 (Two) Times a Day., Disp: , Rfl:   •  colestipol (Colestid) 1 g tablet, Take 2 tablets by mouth 2 (Two) Times a Day. Take separate 2 hours from other medications, Disp: " 120 tablet, Rfl: 4  •  denosumab (PROLIA) 60 MG/ML solution syringe, Inject 1 mL under the skin into the appropriate area as directed Every 6 (Six) Months., Disp: , Rfl:   •  dexlansoprazole (DEXILANT) 60 MG capsule, Take 60 mg by mouth Daily., Disp: , Rfl:   •  magnesium oxide (MAGOX) 400 (241.3 MG) MG tablet tablet, Take 400 mg by mouth Daily., Disp: , Rfl:   •  Menatetrenone (VITAMIN K2) 100 MCG tablet, Take 1 tablet by mouth Daily., Disp: , Rfl:   •  metroNIDAZOLE (METROCREAM) 0.75 % cream, Apply 1 application topically to the appropriate area as directed 2 (Two) Times a Day., Disp: , Rfl:   •  Multiple Vitamins-Minerals (MULTIVITAMIN WITH MINERALS) tablet tablet, Take 1 tablet by mouth Daily., Disp: , Rfl:   •  raloxifene (EVISTA) 60 MG tablet, Take 60 mg by mouth Daily., Disp: , Rfl:   •  vitamin B-12 (CYANOCOBALAMIN) 100 MCG tablet, Take 50 mcg by mouth Daily., Disp: , Rfl:   •  vitamin C (ASCORBIC ACID) 250 MG tablet, Take 250 mg by mouth Daily., Disp: , Rfl:   •  Vitamin D, Cholecalciferol, (CHOLECALCIFEROL) 400 UNITS tablet, Take 400 Units by mouth Daily., Disp: , Rfl:   •  Zinc Sulfate (Zinc 15) 66 MG tablet, , Disp: , Rfl:   •  Cranberry (THERACRAN ONE PO), Take 1 tablet by mouth Daily., Disp: , Rfl:   •  Nutritional Supplements (THERALITH XR) tablet, Take  by mouth., Disp: , Rfl:   •  sodium-potassium-magnesium sulfates (Suprep Bowel Prep Kit) 17.5-3.13-1.6 GM/177ML solution oral solution, Take 2 bottles by mouth Take As Directed., Disp: 354 mL, Rfl: 0    ALLERGIES  Allergies   Allergen Reactions   • Bacitracin-Polymyxin B Other (See Comments)     Patient states that whenever she applies this medication, the skin lesion gets worse/infected.       FAMILY HISTORY:  Family History   Problem Relation Age of Onset   • Cancer Mother    • Breast cancer Mother    • Heart attack Father    • Cancer Sister    • Breast cancer Sister    • Cancer Sister    • Colon cancer Sister    • Breast cancer Other        SOCIAL  HISTORY  Social History     Socioeconomic History   • Marital status:      Spouse name: Not on file   • Number of children: Not on file   • Years of education: Not on file   • Highest education level: Not on file   Tobacco Use   • Smoking status: Never Smoker   • Smokeless tobacco: Never Used   Vaping Use   • Vaping Use: Never used   Substance and Sexual Activity   • Alcohol use: No   • Drug use: No   • Sexual activity: Defer     Birth control/protection: Post-menopausal, Surgical     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  Non-smoker.  Nondrinker.       REVIEW OF SYSTEMS  Review of Systems   Constitutional: Negative for activity change, appetite change, chills, diaphoresis, fatigue, fever, unexpected weight gain and unexpected weight loss.   HENT: Negative for trouble swallowing and voice change.    Gastrointestinal: Positive for diarrhea, rectal pain and GERD. Negative for abdominal distention, abdominal pain, anal bleeding, blood in stool, constipation, nausea, vomiting and indigestion.     I reviewed the above-noted review of systems.    PHYSICAL EXAM   There were no vitals taken for this visit.  General: Alert and oriented x 3. In no apparent or acute distress.  and No stigmata of chronic liver disease  HEENT: Anicteric sclerae. Normal oropharynx  Neck: Supple. Without lymphadenopathy  CV: Regular rate and rhythm, S1, S2  Lungs: Clear to ausculation. Without rales, rhonchi and wheezing  Abdomen:  Soft,non-distended without palpable masses or hepatosplenomeagaly, areas of rebound tenderness or guarding.   Extremeties: without clubbing, cyanosis or edema  Neurologic:  Alert and oriented x 3 without focal motor or sensory deficits  Rectal exam: deferred     Results for orders placed or performed in visit on 02/28/21   COVID-19 PCR, [email or web address], NP SWAB IN [facility] VIRAL TRANSPORT MEDIA 24-30 HR TAT - Swab, Nasopharynx    Specimen: Nasopharynx; Swab   Result Value Ref  Range    SARS-CoV-2 ZENA Not Detected Not Detected        Results Review:  I reviewed the patient's new clinical results.      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  Initiate low FODMAP diet consider trial of Xifaxan, or Lotronex versus Viberzi  2.-Family history of colon cancer  3.-History of dysphagia to solids    PLAN  1.-Low FODMAP diet  2.-Lactose avoidance  3.-Consider trial of Xifaxan  4.-Follow-up appointment in 3 months  5.-Patient will call sooner if she wishes to pursue with medical therapy.  I would initially pursue Xifaxan      Ayush Ga MD  5/6/2021   15:25 EDT

## 2021-06-04 NOTE — TELEPHONE ENCOUNTER
Patient states that she was to call if she was still having diarrhea and would receive medication that would help get the bacteria under control. Note mentions Xifaxan for therapy.

## 2021-08-10 ENCOUNTER — OFFICE VISIT (OUTPATIENT)
Dept: GASTROENTEROLOGY | Facility: CLINIC | Age: 56
End: 2021-08-10

## 2021-08-10 VITALS
DIASTOLIC BLOOD PRESSURE: 73 MMHG | WEIGHT: 121.8 LBS | BODY MASS INDEX: 23.01 KG/M2 | HEART RATE: 77 BPM | TEMPERATURE: 98 F | SYSTOLIC BLOOD PRESSURE: 131 MMHG

## 2021-08-10 DIAGNOSIS — K58.0 IRRITABLE BOWEL SYNDROME WITH DIARRHEA: ICD-10-CM

## 2021-08-10 DIAGNOSIS — R13.19 ESOPHAGEAL DYSPHAGIA: ICD-10-CM

## 2021-08-10 DIAGNOSIS — K21.9 GASTROESOPHAGEAL REFLUX DISEASE WITHOUT ESOPHAGITIS: Primary | ICD-10-CM

## 2021-08-10 PROCEDURE — 99213 OFFICE O/P EST LOW 20 MIN: CPT | Performed by: INTERNAL MEDICINE

## 2021-08-10 NOTE — PROGRESS NOTES
GASTROENTEROLOGY OFFICE NOTE  Lissa Cheng  [id number]  1965            Chief Complaint   Patient presents with   • Follow-up   • Heartburn        HISTORY OF PRESENT ILLNESS:  Patient presents for follow-up status post EGD and colonoscopy in March both of which were unremarkable and specifically negative for eosinophilic esophagitis, Allen's esophagus, H. pylori gastritis celiac disease or inflammatory bowel disease.    She has occasional problems with her hemorrhoids bothering her but she feels that she does better if she increases her dietary fiber intake and does not feel that referral to colorectal surgery is necessary.    She has some postprandial bloating and discomfort and this seems to be worse with raw vegetables and tomatoes etc.    She is doing well otherwise and has no particular new complaints.    PAST MEDICAL HISTORY  Past Medical History:   Diagnosis Date   • Acid reflux    • Anxiety    • Arthritis    • Fibrocystic breast    • History of kidney stones    • Irritability    • Menopausal symptoms    • Menopause    • Mood swings    • Osteopenia    • Osteoporosis    • Ovarian cyst     right   • Pain     Pain/discomfort   • Pelvic pain    • PONV (postoperative nausea and vomiting)    • Uterine prolapse    • Vaginal irritation         PAST SURGICAL HISTORY  Past Surgical History:   Procedure Laterality Date   • BASAL CELL CARCINOMA EXCISION     • BREAST BIOPSY Left 12/18/2007   • COLONOSCOPY     • CYSTOSCOPY W/ LASER LITHOTRIPSY      X2   • ENDOSCOPY     • EXTRACORPOREAL SHOCKWAVE LITHOTRIPSY (ESWL), STENT INSERTION/REMOVAL Right 10/16/2020    Procedure: CYSTOSCOPY RIGHT STENT, RIGHT EXTRACORPOREAL SHOCKWAVE LITHOTRIPSY;  Surgeon: Hermilo Marquez MD;  [facility]: [facility];  Service: Urology;  Laterality: Right;   • HYSTERECTOMY  06/21/1999   • WISDOM TOOTH EXTRACTION          MEDICATIONS:    Current Outpatient Medications:   •  calcium carbonate (OS-THAO) 1250 (500 Ca) MG tablet, Take 1 tablet by  mouth 2 (Two) Times a Day., Disp: , Rfl:   •  colestipol (Colestid) 1 g tablet, Take 2 tablets by mouth 2 (Two) Times a Day. Take separate 2 hours from other medications, Disp: 120 tablet, Rfl: 4  •  Cranberry (THERACRAN ONE PO), Take 1 tablet by mouth Daily., Disp: , Rfl:   •  denosumab (PROLIA) 60 MG/ML solution syringe, Inject 1 mL under the skin into the appropriate area as directed Every 6 (Six) Months., Disp: , Rfl:   •  dexlansoprazole (DEXILANT) 60 MG capsule, Take 60 mg by mouth Daily., Disp: , Rfl:   •  magnesium oxide (MAGOX) 400 (241.3 MG) MG tablet tablet, Take 400 mg by mouth Daily., Disp: , Rfl:   •  Menatetrenone (VITAMIN K2) 100 MCG tablet, Take 1 tablet by mouth Daily., Disp: , Rfl:   •  metroNIDAZOLE (METROCREAM) 0.75 % cream, Apply 1 application topically to the appropriate area as directed 2 (Two) Times a Day., Disp: , Rfl:   •  Multiple Vitamins-Minerals (MULTIVITAMIN WITH MINERALS) tablet tablet, Take 1 tablet by mouth Daily., Disp: , Rfl:   •  Nutritional Supplements (THERALITH XR) tablet, Take  by mouth., Disp: , Rfl:   •  raloxifene (EVISTA) 60 MG tablet, Take 60 mg by mouth Daily., Disp: , Rfl:   •  sodium-potassium-magnesium sulfates (Suprep Bowel Prep Kit) 17.5-3.13-1.6 GM/177ML solution oral solution, Take 2 bottles by mouth Take As Directed., Disp: 354 mL, Rfl: 0  •  vitamin B-12 (CYANOCOBALAMIN) 100 MCG tablet, Take 50 mcg by mouth Daily., Disp: , Rfl:   •  vitamin C (ASCORBIC ACID) 250 MG tablet, Take 250 mg by mouth Daily., Disp: , Rfl:   •  Vitamin D, Cholecalciferol, (CHOLECALCIFEROL) 400 UNITS tablet, Take 400 Units by mouth Daily., Disp: , Rfl:   •  Zinc Sulfate (Zinc 15) 66 MG tablet, , Disp: , Rfl:     ALLERGIES  Allergies   Allergen Reactions   • Bacitracin-Polymyxin B Other (See Comments)     Patient states that whenever she applies this medication, the skin lesion gets worse/infected.       FAMILY HISTORY:  Family History   Problem Relation Age of Onset   • Cancer Mother     • Breast cancer Mother    • Heart attack Father    • Cancer Sister    • Breast cancer Sister    • Cancer Sister    • Colon cancer Sister    • Breast cancer Other        SOCIAL HISTORY  Social History     Socioeconomic History   • Marital status:      Spouse name: Not on file   • Number of children: Not on file   • Years of education: Not on file   • Highest education level: Not on file   Tobacco Use   • Smoking status: Never Smoker   • Smokeless tobacco: Never Used   Vaping Use   • Vaping Use: Never used   Substance and Sexual Activity   • Alcohol use: No   • Drug use: No   • Sexual activity: Defer     Birth control/protection: Post-menopausal, Surgical     REVIEW OF SYSTEMS  Review of Systems   Constitutional: Negative for activity change, appetite change, chills, diaphoresis, fatigue, fever, unexpected weight gain and unexpected weight loss.   HENT: Negative for trouble swallowing and voice change.    Gastrointestinal: Negative for abdominal distention, abdominal pain, anal bleeding, blood in stool, constipation, diarrhea, nausea, rectal pain, vomiting, GERD and indigestion.     I reviewed the above-noted review of systems    PHYSICAL EXAM   /73 (BP Location: Left arm, Patient Position: Sitting, Cuff Size: Adult)   Pulse 77   Temp 98 °F (36.7 °C) (Temporal)   Wt 55.2 kg (121 lb 12.8 oz)   BMI 23.01 kg/m²   General: Well-developed pleasant white female no apparent or acute distress  HEENT: Anicteric sclera  Neck: Without observed rigidity  Lungs: Grossly normal respirations without labored breathing or audible wheezing  Extremeties: without clubbing, cyanosis or edema  Neurologic: Without gross focal motor deficits.  Normal cognition and affect.  Normal gait     ASSESSMENT  1.-Nonulcer dyspepsia suggestive of either lactose intolerance (she has noted an improvement of her symptoms with diminution of dairy intake), FODMAP sensitivity  2.-Internal hemorrhoids  3.-Patient up-to-date with her colon  cancer screening  4.-GERD doing well on Dexilant 60 mg daily    PLAN  1.-Increase dietary fiber to greater than 30 g/day.  Literature provided.  Consider usage of Beano  2.-Low FODMAP diet  3.-Dairy avoidance  4.-GI follow-up as needed        Ayush Ga MD  8/10/2021   13:34 EDT

## 2021-12-16 ENCOUNTER — OFFICE VISIT (OUTPATIENT)
Dept: OBSTETRICS AND GYNECOLOGY | Facility: CLINIC | Age: 56
End: 2021-12-16

## 2021-12-16 VITALS
WEIGHT: 119 LBS | RESPIRATION RATE: 16 BRPM | BODY MASS INDEX: 22.48 KG/M2 | DIASTOLIC BLOOD PRESSURE: 70 MMHG | SYSTOLIC BLOOD PRESSURE: 114 MMHG

## 2021-12-16 DIAGNOSIS — N60.11 FIBROCYSTIC BREAST CHANGES, BILATERAL: ICD-10-CM

## 2021-12-16 DIAGNOSIS — M81.0 OSTEOPOROSIS WITHOUT CURRENT PATHOLOGICAL FRACTURE, UNSPECIFIED OSTEOPOROSIS TYPE: ICD-10-CM

## 2021-12-16 DIAGNOSIS — Z01.419 ENCOUNTER FOR GYNECOLOGICAL EXAMINATION WITHOUT ABNORMAL FINDING: Primary | ICD-10-CM

## 2021-12-16 DIAGNOSIS — N60.12 FIBROCYSTIC BREAST CHANGES, BILATERAL: ICD-10-CM

## 2021-12-16 DIAGNOSIS — Z80.3 FAMILY HISTORY OF BREAST CANCER: ICD-10-CM

## 2021-12-16 PROCEDURE — 99396 PREV VISIT EST AGE 40-64: CPT | Performed by: NURSE PRACTITIONER
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
## 2022-12-21 ENCOUNTER — OFFICE VISIT (OUTPATIENT)
Dept: OBSTETRICS AND GYNECOLOGY | Facility: CLINIC | Age: 57
End: 2022-12-21

## 2022-12-21 VITALS
WEIGHT: 126 LBS | BODY MASS INDEX: 23.81 KG/M2 | SYSTOLIC BLOOD PRESSURE: 118 MMHG | DIASTOLIC BLOOD PRESSURE: 70 MMHG | RESPIRATION RATE: 16 BRPM

## 2022-12-21 DIAGNOSIS — Z80.3 FAMILY HISTORY OF BREAST CANCER: ICD-10-CM

## 2022-12-21 DIAGNOSIS — N60.12 FIBROCYSTIC BREAST CHANGES, BILATERAL: ICD-10-CM

## 2022-12-21 DIAGNOSIS — Z01.411 ENCOUNTER FOR GYNECOLOGICAL EXAMINATION WITH ABNORMAL FINDING: Primary | ICD-10-CM

## 2022-12-21 DIAGNOSIS — K64.4 EXTERNAL HEMORRHOIDS: ICD-10-CM

## 2022-12-21 DIAGNOSIS — N60.11 FIBROCYSTIC BREAST CHANGES, BILATERAL: ICD-10-CM

## 2022-12-21 DIAGNOSIS — M81.8 OTHER OSTEOPOROSIS WITHOUT CURRENT PATHOLOGICAL FRACTURE: ICD-10-CM

## 2022-12-21 PROCEDURE — 99396 PREV VISIT EST AGE 40-64: CPT | Performed by: NURSE PRACTITIONER

## 2022-12-21 RX ORDER — DICYCLOMINE HYDROCHLORIDE 10 MG/5ML
SOLUTION ORAL
COMMUNITY
Start: 2022-08-19

## 2022-12-21 RX ORDER — DICYCLOMINE HYDROCHLORIDE 10 MG/1
10 CAPSULE ORAL 2 TIMES DAILY
COMMUNITY
Start: 2022-12-16
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
## 2023-03-20 NOTE — TELEPHONE ENCOUNTER
Pt had colonoscopy in March 2021 and it has been going on since then.  Whenever she use the restroom she has hemmorhoids. Avoids corn and milk products.  Having internal and external hemorrhoids.  Been using Preparations H, Tucks, Internal and External Gel. Avoids corn and milk products.  What are your recommendations? She asks is there a specific hemorrhoid cream that you suggest.   She is in [place] for the next couple of hours.    Please Advise...

## 2023-03-21 ENCOUNTER — TELEPHONE (OUTPATIENT)
Dept: GASTROENTEROLOGY | Facility: CLINIC | Age: 58
End: 2023-03-21
Payer: COMMERCIAL

## 2023-03-21 RX ORDER — HYDROCORTISONE ACETATE 25 MG/1
25 SUPPOSITORY RECTAL NIGHTLY
Qty: 30 SUPPOSITORY | Refills: 0 | Status: SHIPPED | OUTPATIENT
Start: 2023-03-21

## 2023-03-21 NOTE — TELEPHONE ENCOUNTER
Pt called and stated that the Annusol HC was $70. The pharmacist said he could do the cream for $20. Pt wants to know if she can substitute or does she have to use the Brand?  Please Advise

## 2023-04-03 ENCOUNTER — TELEPHONE (OUTPATIENT)
Dept: GASTROENTEROLOGY | Facility: CLINIC | Age: 58
End: 2023-04-03
Payer: COMMERCIAL

## 2023-04-03 DIAGNOSIS — K64.8 OTHER HEMORRHOIDS: Primary | ICD-10-CM

## 2023-04-03 NOTE — TELEPHONE ENCOUNTER
No significant PVD; no other work-up at this time  Apologies for his wait time; was covering hospital rounding over the past week  Please notify patient. Thanks!  Andres Muhammad MD, MS, FACC      Pt was Referred to   [facility]  [address], [place] [address]    Appointment Date is 5/3/2023  Appointment Time is 1:15pm.  Arrival Time is 1:00pm for new patient paperwork.  Pt will be seeing Dr. [place].

## 2023-08-08 ENCOUNTER — INFUSION (OUTPATIENT)
Dept: ONCOLOGY | Facility: HOSPITAL | Age: 58
End: 2023-08-08
Payer: COMMERCIAL

## 2023-08-08 VITALS
HEART RATE: 83 BPM | RESPIRATION RATE: 16 BRPM | TEMPERATURE: 98.2 F | SYSTOLIC BLOOD PRESSURE: 122 MMHG | DIASTOLIC BLOOD PRESSURE: 64 MMHG

## 2023-08-08 DIAGNOSIS — M81.8 OTHER OSTEOPOROSIS WITHOUT CURRENT PATHOLOGICAL FRACTURE: Primary | ICD-10-CM

## 2023-08-08 PROCEDURE — 96372 THER/PROPH/DIAG INJ SC/IM: CPT

## 2023-08-08 PROCEDURE — 25010000002 DENOSUMAB 60 MG/ML SOLUTION PREFILLED SYRINGE: Performed by: INTERNAL MEDICINE

## 2023-08-08 RX ORDER — L.ACID,CASEI/B.ANIMAL/S.THERMO 16B CELL
1 CAPSULE ORAL DAILY
COMMUNITY

## 2023-08-08 RX ADMIN — DENOSUMAB 60 MG: 60 INJECTION SUBCUTANEOUS at 14:02

## 2024-02-14 ENCOUNTER — OFFICE VISIT (OUTPATIENT)
Dept: OBSTETRICS AND GYNECOLOGY | Facility: CLINIC | Age: 59
End: 2024-02-14
Payer: COMMERCIAL

## 2024-02-14 VITALS
BODY MASS INDEX: 21.52 KG/M2 | WEIGHT: 114 LBS | DIASTOLIC BLOOD PRESSURE: 70 MMHG | HEIGHT: 61 IN | SYSTOLIC BLOOD PRESSURE: 110 MMHG

## 2024-02-14 DIAGNOSIS — N95.2 ATROPHY OF VAGINA: ICD-10-CM

## 2024-02-14 DIAGNOSIS — Z01.411 ENCOUNTER FOR GYNECOLOGICAL EXAMINATION WITH ABNORMAL FINDING: Primary | ICD-10-CM

## 2024-02-14 RX ORDER — HYDROCORTISONE 25 MG/G
1 CREAM TOPICAL 2 TIMES DAILY PRN
COMMUNITY
Start: 2023-12-08

## 2024-02-14 RX ORDER — MAGNESIUM GLUCONATE 30 MG(550)
TABLET ORAL
COMMUNITY
End: 2024-02-14 | Stop reason: SDUPTHER

## 2024-02-16 ENCOUNTER — TELEPHONE (OUTPATIENT)
Dept: OBSTETRICS AND GYNECOLOGY | Facility: CLINIC | Age: 59
End: 2024-02-16
Payer: COMMERCIAL

## 2024-02-16 NOTE — TELEPHONE ENCOUNTER
Pt called and she said Denice had spoken to her about getting the Estradiol cream for vaginal dryness. She would like to go ahead and have this called in.    Please advise    Thank you

## 2024-02-19 RX ORDER — ESTRADIOL 0.1 MG/G
CREAM VAGINAL
Qty: 42.5 G | Refills: 4 | Status: SHIPPED | OUTPATIENT
Start: 2024-02-19

## 2024-03-18 ENCOUNTER — INFUSION (OUTPATIENT)
Dept: ONCOLOGY | Facility: HOSPITAL | Age: 59
End: 2024-03-18
Payer: COMMERCIAL

## 2024-03-18 VITALS
BODY MASS INDEX: 21.55 KG/M2 | HEIGHT: 61 IN | RESPIRATION RATE: 16 BRPM | SYSTOLIC BLOOD PRESSURE: 141 MMHG | TEMPERATURE: 96.4 F | DIASTOLIC BLOOD PRESSURE: 69 MMHG | HEART RATE: 78 BPM

## 2024-03-18 DIAGNOSIS — M81.8 OTHER OSTEOPOROSIS WITHOUT CURRENT PATHOLOGICAL FRACTURE: Primary | ICD-10-CM

## 2024-03-18 PROCEDURE — 96372 THER/PROPH/DIAG INJ SC/IM: CPT

## 2024-03-18 PROCEDURE — 25010000002 DENOSUMAB 60 MG/ML SOLUTION PREFILLED SYRINGE: Performed by: INTERNAL MEDICINE

## 2024-03-18 RX ADMIN — DENOSUMAB 60 MG: 60 INJECTION SUBCUTANEOUS at 10:55

## 2024-05-03 ENCOUNTER — TELEPHONE (OUTPATIENT)
Dept: GASTROENTEROLOGY | Facility: CLINIC | Age: 59
End: 2024-05-03
Payer: COMMERCIAL

## 2024-05-14 PROBLEM — M19.90 OSTEOARTHRITIS: Status: ACTIVE | Noted: 2024-05-14

## 2024-05-15 ENCOUNTER — OFFICE VISIT (OUTPATIENT)
Age: 59
End: 2024-05-15
Payer: COMMERCIAL

## 2024-05-15 VITALS
TEMPERATURE: 97.2 F | BODY MASS INDEX: 22.01 KG/M2 | SYSTOLIC BLOOD PRESSURE: 108 MMHG | HEART RATE: 70 BPM | DIASTOLIC BLOOD PRESSURE: 68 MMHG | HEIGHT: 61 IN | WEIGHT: 116.6 LBS

## 2024-05-15 DIAGNOSIS — M19.90 OSTEOARTHRITIS, UNSPECIFIED OSTEOARTHRITIS TYPE, UNSPECIFIED SITE: ICD-10-CM

## 2024-05-15 DIAGNOSIS — R76.8 POSITIVE ANA (ANTINUCLEAR ANTIBODY): ICD-10-CM

## 2024-05-15 DIAGNOSIS — M25.511 RIGHT SHOULDER PAIN, UNSPECIFIED CHRONICITY: ICD-10-CM

## 2024-05-15 DIAGNOSIS — Z79.899 HISTORY OF ONGOING TREATMENT WITH HIGH-RISK MEDICATION: ICD-10-CM

## 2024-05-15 DIAGNOSIS — M81.8 OTHER OSTEOPOROSIS WITHOUT CURRENT PATHOLOGICAL FRACTURE: Primary | ICD-10-CM

## 2024-05-15 PROBLEM — K58.9 IBS (IRRITABLE BOWEL SYNDROME): Status: ACTIVE | Noted: 2024-05-15
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
## 2024-05-15 PROBLEM — E55.9 VITAMIN D DEFICIENCY: Status: ACTIVE | Noted: 2024-05-15

## 2024-05-15 PROBLEM — S06.0XAA MILD CONCUSSION: Status: ACTIVE | Noted: 2024-05-15

## 2024-05-15 PROBLEM — S00.83XA TRAUMATIC HEMATOMA OF FOREHEAD: Status: ACTIVE | Noted: 2024-05-15

## 2024-05-15 PROBLEM — L71.9 ROSACEA: Status: ACTIVE | Noted: 2021-12-16

## 2024-05-15 PROBLEM — M65.30 TRIGGER FINGER: Status: ACTIVE | Noted: 2024-05-15
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
## 2024-05-15 NOTE — PROGRESS NOTES
Office Visit       Date: 05/15/2024   Patient Name: Lissa Cheng  MRN: [id number]  YOB: 1965    Referring Physician: Justine Parra PA     Chief Complaint:   Chief Complaint   Patient presents with    Osteoarthritis       History of Present Illness: Lissa Cheng is a 58 y.o. female who is here today for above osteoporosis with osteoarthritis.    She continues on Prolia every 6 months.  She started this in September 2016.  Medication is well-tolerated.  Her most recent dose was March 2024.  She is scheduled for upcoming dose September 2024.  Most recent bone density was September 27, 2022.  It did show improvement in bone density on Prolia.  She had a fall yesterday.  She was cleaning her bathroom and slipped on the tub and hit her head.  She had a negative CT scan.  She also had cervical spine x-rays, hip x-rays, knee x-rays that were normal.  She denies any fracture.  No recent or upcoming dental work.  No thigh pain.  Reports she has cut back on her calcium supplementation due to GI issues.  She does take vitamin D daily.    Overall she reports she is doing well today.  No significant pain related to her osteoarthritis.      Subjective     Past Medical History:   Past Medical History:   Diagnosis Date    Acid reflux     Anxiety     Arthritis     Basal cell carcinoma     Fatigue     Fibrocystic breast     GERD (gastroesophageal reflux disease)     History of kidney stones     Irritability     Menopausal symptoms     Menopause     Mood swings     Osteoarthritis     Osteopenia     Osteoporosis     Ovarian cyst     right    Pain     Pain/discomfort    Pelvic pain     PONV (postoperative nausea and vomiting)     Uterine prolapse     Vaginal irritation        Past Surgical History:   Past Surgical History:   Procedure Laterality Date    BASAL CELL CARCINOMA EXCISION      BREAST BIOPSY Left 12/18/2007    COLONOSCOPY  2021    CYSTOSCOPY W/ LASER LITHOTRIPSY      X2    ENDOSCOPY       EXTRACORPOREAL SHOCKWAVE LITHOTRIPSY (ESWL), STENT INSERTION/REMOVAL Right 10/16/2020    Procedure: CYSTOSCOPY RIGHT STENT, RIGHT EXTRACORPOREAL SHOCKWAVE LITHOTRIPSY;  Surgeon: Hermilo Marquez MD;  Location: [facility];  Service: Urology;  Laterality: Right;    HYSTERECTOMY  06/21/1999    WISDOM TOOTH EXTRACTION         Family History:   Family History   Problem Relation Age of Onset    Cancer Mother     Breast cancer Mother     Arthritis Mother     Heart attack Father     Cancer Sister     Breast cancer Sister     Cancer Sister     Colon cancer Sister     Breast cancer Other        Social History:   Social History     Socioeconomic History    Marital status:    Tobacco Use    Smoking status: Never    Smokeless tobacco: Never   Vaping Use    Vaping status: Never Used   Substance and Sexual Activity    Alcohol use: No    Drug use: No    Sexual activity: Yes     Partners: Male     Birth control/protection: Post-menopausal, Surgical       Medications:   Current Outpatient Medications:     denosumab (PROLIA) 60 MG/ML solution syringe, Inject 1 mL under the skin into the appropriate area as directed Every 6 (Six) Months., Disp: , Rfl:     estradiol (ESTRACE VAGINAL) 0.1 MG/GM vaginal cream, Insert 1 gm intravaginally twice weekly at bedtime., Disp: 42.5 g, Rfl: 4    famotidine-calcium carb-mag hydroxide (PEPCID COMPLETE) -165 MG chewable tablet, Chew 2 tablets Every Other Day., Disp: , Rfl:     Hydrocortisone, Perianal, (ANUSOL-HC) 2.5 % rectal cream, Insert 1 Application into the rectum 2 (Two) Times a Day As Needed. Apply topically to the affected area twice daily, Disp: , Rfl:     Menatetrenone (VITAMIN K2) 100 MCG tablet, Take 1 tablet by mouth Daily., Disp: , Rfl:     metroNIDAZOLE (METROCREAM) 0.75 % cream, Apply 1 Application topically to the appropriate area as directed 2 (Two) Times a Day., Disp: , Rfl:     Probiotic Product (Risaquad-2) capsule capsule, Take 1 capsule by mouth Daily.,  "Disp: , Rfl:     vitamin B-12 (CYANOCOBALAMIN) 100 MCG tablet, Take 0.5 tablets by mouth Daily., Disp: , Rfl:     Vitamin D, Cholecalciferol, (CHOLECALCIFEROL) 400 UNITS tablet, Take 1 tablet by mouth Daily., Disp: , Rfl:     Zinc Sulfate (Zinc 15) 66 MG tablet, , Disp: , Rfl:     calcium carbonate (OS-THAO) 1250 (500 Ca) MG tablet, Take 1 tablet by mouth 2 (Two) Times a Day., Disp: , Rfl:     magnesium citrate 1.745 GM/30ML solution solution, Take  by mouth 1 (One) Time. (Patient not taking: Reported on 5/15/2024), Disp: , Rfl:     magnesium oxide (MAG-OX) 400 tablet tablet, Take 1 tablet by mouth Daily., Disp: , Rfl:     Multiple Vitamins-Minerals (MULTIVITAMIN WITH MINERALS) tablet tablet, Take 1 tablet by mouth Daily., Disp: , Rfl:     multivitamin (MULTI-VITAMINS PO), Take  by mouth Daily. (Patient not taking: Reported on 5/15/2024), Disp: , Rfl:     vitamin C (ASCORBIC ACID) 250 MG tablet, Take 250 mg by mouth Daily., Disp: , Rfl:     Allergies:   Allergies   Allergen Reactions    Bacitracin-Polymyxin B Other (See Comments)     Patient states that whenever she applies this medication, the skin lesion gets worse/infected.       I reviewed the patient's chief complaint, history of present illness, review of systems, past medical history, surgical history, family history, social history, medications and allergy list.     Objective    Vital Signs:   Vitals:    05/15/24 1419   BP: 108/68   BP Location: Left arm   Patient Position: Sitting   Cuff Size: Adult   Pulse: 70   Temp: 97.2 °F (36.2 °C)   Weight: 52.9 kg (116 lb 9.6 oz)   Height: 154.9 cm (60.98\")   PainSc:   2   PainLoc: Finger     Body mass index is 22.04 kg/m².   BMI is within normal parameters. No other follow-up for BMI required.       Physical Exam:  Physical Exam  Constitutional:       Appearance: Normal appearance.   HENT:      Head: Normocephalic and atraumatic.   Eyes:      Conjunctiva/sclera: Conjunctivae normal.      Pupils: Pupils are equal, " round, and reactive to light.   Cardiovascular:      Rate and Rhythm: Normal rate and regular rhythm.      Heart sounds: Normal heart sounds.   Pulmonary:      Effort: Pulmonary effort is normal.      Breath sounds: Normal breath sounds.   Musculoskeletal:         General: Normal range of motion.      Cervical back: Normal range of motion.   Skin:     General: Skin is warm and dry.      Comments: She has bruising and small hematoma left forehead s/p fall yesterday  Small abrasion left cheek   Neurological:      General: No focal deficit present.      Mental Status: She is alert and oriented to person, place, and time.   Psychiatric:         Mood and Affect: Mood normal.         Behavior: Behavior normal.         Thought Content: Thought content normal.         Judgment: Judgment normal.          Results Review:   Imaging Results (Last 24 Hours)       ** No results found for the last 24 hours. **            Assessment / Plan    Assessment/Plan:   Diagnoses and all orders for this visit:    1. Other osteoporosis without current pathological fracture (Primary)  Assessment & Plan:  Prior Evista 2008-3/22-GI upset  Prolia started September 2016 when bone density scan showed osteoporosis in the hip  DEXA 9/13/2018 and ACL showed improved scores to osteopenia  DEXA 9/27/2022 with osteopenia with 10% improvement in the hip and 4% in the lumbar spine    She continues on calcium and vitamin D supplements daily  Continue weight bearing exercise  She is tolerating Prolia well.  Recommend continue Prolia every 6 months.  Her most recent dose was given 3/18/2024.  Her next dose is scheduled for September 2024.  We will place a lab order for her to do early to mid September 2024 prior to her next Prolia dose.  We will update bone density when we return to [facility].  She had a fall yesterday and hit her head. She did seek medical attention and had a negative CT head. CXR, hip X-rays, neck films were all negative for  fracture.    Orders:  -     Cancel: Vitamin D,25-Hydroxy; Future  -     Cancel: Comprehensive Metabolic Panel; Future  -     Comprehensive Metabolic Panel; Future  -     Vitamin D,25-Hydroxy; Future    2. Osteoarthritis, unspecified osteoarthritis type, unspecified site  Assessment & Plan:  Overall stable and doing well clinically.  Tylenol as needed is okay      3. History of ongoing treatment with high-risk medication  Assessment & Plan:  Risks and benefits of Prolia were discussed in detail including but not limited to osteonecrosis of the jaw, atypical femur fracture, bone death, kidney failure, hypocalcemia.  Patient has no reported jaw or tooth pain.  No thigh pain.  No recent or upcoming dental work.    Orders:  -     Cancel: Vitamin D,25-Hydroxy; Future  -     Cancel: Comprehensive Metabolic Panel; Future  -     Comprehensive Metabolic Panel; Future  -     Vitamin D,25-Hydroxy; Future    4. Positive DEO (antinuclear antibody)  Assessment & Plan:  Unspecified bases.  No evidence of lupus or connective tissue disease.  Probable false positive.      5. Right shoulder pain, unspecified chronicity  Assessment & Plan:  Intermittent since injury in June 2013.  She has previously seen [facility].  MRI August 2013 with Dr. Lombardi  Has also seen Dr. Saini NS  Subacromial bursitis  Not bothering her presently          Follow Up:   Return in about 6 months (around 11/15/2024) for Dr. Choudhary.        ANNAMARIE Hanna  [facility]

## 2024-05-15 NOTE — ASSESSMENT & PLAN NOTE
Risks and benefits of Prolia were discussed in detail including but not limited to osteonecrosis of the jaw, atypical femur fracture, bone death, kidney failure, hypocalcemia.  Patient has no reported jaw or tooth pain.  No thigh pain.  No recent or upcoming dental work.

## 2024-07-10 ENCOUNTER — TELEPHONE (OUTPATIENT)
Dept: GASTROENTEROLOGY | Facility: CLINIC | Age: 59
End: 2024-07-10
Payer: COMMERCIAL

## 2024-07-10 NOTE — TELEPHONE ENCOUNTER
Pt stated that she is having some issues. Drink lots of water, eats fiber, good foods, avoids dairy. It feels like needles every time she go to the bathroom from her hemorrhoids. She does not get any relief has to stand to do most things. Pt does not want to have surgery if she does not have to. She does see Dr. Colorado and will call to make appointment.     Spoke with patient per Dr. Ga and she stated she has an appointment with Dr. Colorado on 7/31/2024 and will have him send his notes.

## 2024-09-09 ENCOUNTER — LAB (OUTPATIENT)
Facility: HOSPITAL | Age: 59
End: 2024-09-09
Payer: COMMERCIAL

## 2024-09-09 ENCOUNTER — PRE-ADMISSION TESTING (OUTPATIENT)
Dept: PREADMISSION TESTING | Facility: HOSPITAL | Age: 59
End: 2024-09-09
Payer: COMMERCIAL

## 2024-09-09 DIAGNOSIS — M81.8 OTHER OSTEOPOROSIS WITHOUT CURRENT PATHOLOGICAL FRACTURE: ICD-10-CM

## 2024-09-09 DIAGNOSIS — Z79.899 HISTORY OF ONGOING TREATMENT WITH HIGH-RISK MEDICATION: ICD-10-CM

## 2024-09-09 LAB
ANION GAP SERPL CALCULATED.3IONS-SCNC: 11 MMOL/L (ref 5–15)
BASOPHILS # BLD AUTO: 0.04 10*3/MM3 (ref 0–0.2)
BASOPHILS NFR BLD AUTO: 0.6 % (ref 0–1.5)
BUN SERPL-MCNC: 14 MG/DL (ref 8–23)
BUN/CREAT SERPL: 18.7 (ref 7–25)
CALCIUM SPEC-SCNC: 9.5 MG/DL (ref 8.6–10.5)
CHLORIDE SERPL-SCNC: 104 MMOL/L (ref 98–107)
CO2 SERPL-SCNC: 26 MMOL/L (ref 22–29)
CREAT SERPL-MCNC: 0.75 MG/DL (ref 0.57–1)
DEPRECATED RDW RBC AUTO: 43.1 FL (ref 37–54)
EGFRCR SERPLBLD CKD-EPI 2021: 90.7 ML/MIN/1.73
EOSINOPHIL # BLD AUTO: 0.11 10*3/MM3 (ref 0–0.4)
EOSINOPHIL NFR BLD AUTO: 1.8 % (ref 0.3–6.2)
ERYTHROCYTE [DISTWIDTH] IN BLOOD BY AUTOMATED COUNT: 13.2 % (ref 12.3–15.4)
GLUCOSE SERPL-MCNC: 91 MG/DL (ref 65–99)
HCT VFR BLD AUTO: 39.5 % (ref 34–46.6)
HGB BLD-MCNC: 12.9 G/DL (ref 12–15.9)
IMM GRANULOCYTES # BLD AUTO: 0.01 10*3/MM3 (ref 0–0.05)
IMM GRANULOCYTES NFR BLD AUTO: 0.2 % (ref 0–0.5)
LYMPHOCYTES # BLD AUTO: 1.97 10*3/MM3 (ref 0.7–3.1)
LYMPHOCYTES NFR BLD AUTO: 31.4 % (ref 19.6–45.3)
MCH RBC QN AUTO: 29.2 PG (ref 26.6–33)
MCHC RBC AUTO-ENTMCNC: 32.7 G/DL (ref 31.5–35.7)
MCV RBC AUTO: 89.4 FL (ref 79–97)
MONOCYTES # BLD AUTO: 0.71 10*3/MM3 (ref 0.1–0.9)
MONOCYTES NFR BLD AUTO: 11.3 % (ref 5–12)
NEUTROPHILS NFR BLD AUTO: 3.43 10*3/MM3 (ref 1.7–7)
NEUTROPHILS NFR BLD AUTO: 54.7 % (ref 42.7–76)
NRBC BLD AUTO-RTO: 0 /100 WBC (ref 0–0.2)
PLATELET # BLD AUTO: 287 10*3/MM3 (ref 140–450)
PMV BLD AUTO: 9.4 FL (ref 6–12)
POTASSIUM SERPL-SCNC: 4.7 MMOL/L (ref 3.5–5.2)
RBC # BLD AUTO: 4.42 10*6/MM3 (ref 3.77–5.28)
SODIUM SERPL-SCNC: 141 MMOL/L (ref 136–145)
WBC NRBC COR # BLD AUTO: 6.27 10*3/MM3 (ref 3.4–10.8)

## 2024-09-09 PROCEDURE — 82306 VITAMIN D 25 HYDROXY: CPT

## 2024-09-09 PROCEDURE — 93005 ELECTROCARDIOGRAM TRACING: CPT

## 2024-09-09 PROCEDURE — 85025 COMPLETE CBC W/AUTO DIFF WBC: CPT

## 2024-09-09 PROCEDURE — 36415 COLL VENOUS BLD VENIPUNCTURE: CPT

## 2024-09-09 PROCEDURE — 80053 COMPREHEN METABOLIC PANEL: CPT

## 2024-09-10 ENCOUNTER — TELEPHONE (OUTPATIENT)
Age: 59
End: 2024-09-10
Payer: COMMERCIAL

## 2024-09-10 LAB
25(OH)D3 SERPL-MCNC: 74.7 NG/ML (ref 30–100)
ALBUMIN SERPL-MCNC: 4.2 G/DL (ref 3.5–5.2)
ALBUMIN/GLOB SERPL: 1.4 G/DL
ALP SERPL-CCNC: 65 U/L (ref 39–117)
ALT SERPL W P-5'-P-CCNC: 14 U/L (ref 1–33)
ANION GAP SERPL CALCULATED.3IONS-SCNC: 10.5 MMOL/L (ref 5–15)
AST SERPL-CCNC: 23 U/L (ref 1–32)
BILIRUB SERPL-MCNC: 0.2 MG/DL (ref 0–1.2)
BUN SERPL-MCNC: 14 MG/DL (ref 8–23)
BUN/CREAT SERPL: 18.9 (ref 7–25)
CALCIUM SPEC-SCNC: 9.4 MG/DL (ref 8.6–10.5)
CHLORIDE SERPL-SCNC: 104 MMOL/L (ref 98–107)
CO2 SERPL-SCNC: 27.5 MMOL/L (ref 22–29)
CREAT SERPL-MCNC: 0.74 MG/DL (ref 0.57–1)
EGFRCR SERPLBLD CKD-EPI 2021: 92.2 ML/MIN/1.73
GLOBULIN UR ELPH-MCNC: 3 GM/DL
GLUCOSE SERPL-MCNC: 97 MG/DL (ref 65–99)
POTASSIUM SERPL-SCNC: 3.9 MMOL/L (ref 3.5–5.2)
PROT SERPL-MCNC: 7.2 G/DL (ref 6–8.5)
QT INTERVAL: 398 MS
QTC INTERVAL: 426 MS
SODIUM SERPL-SCNC: 142 MMOL/L (ref 136–145)

## 2024-09-10 NOTE — TELEPHONE ENCOUNTER
Patient called wanting to know if it would be okay for her to reschedule her Prolia that is currently scheduled for 9/18. She states she just found out that she has to have surgery this coming Thursday so she wants to reschedule for around 10/9. I advised that  as long it was still within 6-8 weeks of when she had labs drawn then that if fine for her to reschedule. She had labs drawn yesterday 9/9. She expressed understanding and will contact infusion center to reschedule.

## 2024-09-12 PROCEDURE — 88304 TISSUE EXAM BY PATHOLOGIST: CPT | Performed by: COLON & RECTAL SURGERY

## 2024-09-13 ENCOUNTER — LAB REQUISITION (OUTPATIENT)
Dept: LAB | Facility: HOSPITAL | Age: 59
End: 2024-09-13
Payer: COMMERCIAL

## 2024-09-13 DIAGNOSIS — K64.2 THIRD DEGREE HEMORRHOIDS: ICD-10-CM

## 2024-09-16 LAB
CYTO UR: NORMAL
LAB AP CASE REPORT: NORMAL
LAB AP CLINICAL INFORMATION: NORMAL
PATH REPORT.FINAL DX SPEC: NORMAL
PATH REPORT.GROSS SPEC: NORMAL

## 2024-10-09 ENCOUNTER — TRANSCRIBE ORDERS (OUTPATIENT)
Dept: ADMINISTRATIVE | Facility: HOSPITAL | Age: 59
End: 2024-10-09
Payer: COMMERCIAL

## 2024-10-09 ENCOUNTER — INFUSION (OUTPATIENT)
Dept: ONCOLOGY | Facility: HOSPITAL | Age: 59
End: 2024-10-09
Payer: COMMERCIAL

## 2024-10-09 VITALS
DIASTOLIC BLOOD PRESSURE: 59 MMHG | HEART RATE: 76 BPM | RESPIRATION RATE: 16 BRPM | TEMPERATURE: 97.7 F | SYSTOLIC BLOOD PRESSURE: 128 MMHG

## 2024-10-09 DIAGNOSIS — R10.11 RIGHT UPPER QUADRANT PAIN: Primary | ICD-10-CM

## 2024-10-09 DIAGNOSIS — M81.8 OTHER OSTEOPOROSIS WITHOUT CURRENT PATHOLOGICAL FRACTURE: Primary | ICD-10-CM

## 2024-10-09 PROCEDURE — 25010000002 DENOSUMAB 60 MG/ML SOLUTION PREFILLED SYRINGE: Performed by: INTERNAL MEDICINE

## 2024-10-09 PROCEDURE — 96372 THER/PROPH/DIAG INJ SC/IM: CPT

## 2024-10-09 RX ADMIN — DENOSUMAB 60 MG: 60 INJECTION SUBCUTANEOUS at 13:27

## 2024-10-15 ENCOUNTER — HOSPITAL ENCOUNTER (OUTPATIENT)
Dept: ULTRASOUND IMAGING | Facility: HOSPITAL | Age: 59
Discharge: HOME | End: 2024-10-15
Admitting: COLON & RECTAL SURGERY
Payer: COMMERCIAL

## 2024-10-15 DIAGNOSIS — R10.11 RIGHT UPPER QUADRANT PAIN: ICD-10-CM

## 2024-10-15 PROCEDURE — 76705 ECHO EXAM OF ABDOMEN: CPT

## 2024-11-15 ENCOUNTER — OFFICE VISIT (OUTPATIENT)
Age: 59
End: 2024-11-15
Payer: COMMERCIAL

## 2024-11-15 VITALS
TEMPERATURE: 97.6 F | HEART RATE: 74 BPM | WEIGHT: 114.5 LBS | BODY MASS INDEX: 21.62 KG/M2 | DIASTOLIC BLOOD PRESSURE: 70 MMHG | HEIGHT: 61 IN | SYSTOLIC BLOOD PRESSURE: 120 MMHG

## 2024-11-15 DIAGNOSIS — Z79.899 HISTORY OF ONGOING TREATMENT WITH HIGH-RISK MEDICATION: ICD-10-CM

## 2024-11-15 DIAGNOSIS — E55.9 VITAMIN D DEFICIENCY: ICD-10-CM

## 2024-11-15 DIAGNOSIS — M81.8 OTHER OSTEOPOROSIS WITHOUT CURRENT PATHOLOGICAL FRACTURE: Primary | ICD-10-CM

## 2024-11-15 RX ORDER — MULTIVIT WITH MINERALS/LUTEIN
TABLET ORAL
COMMUNITY
Start: 2024-08-01

## 2024-11-15 RX ORDER — HYDROCODONE BITARTRATE AND ACETAMINOPHEN 5; 325 MG/1; MG/1
TABLET ORAL
COMMUNITY
Start: 2024-09-12

## 2024-11-15 RX ORDER — CALCIUM CARBONATE/VITAMIN D3 600MG-62.5
CAPSULE ORAL
COMMUNITY
Start: 2024-08-01

## 2024-11-15 RX ORDER — MONTELUKAST SODIUM 4 MG/1
TABLET, CHEWABLE ORAL
COMMUNITY
Start: 2024-11-04

## 2024-11-15 NOTE — PROGRESS NOTES
Office Visit       Date: 11/15/2024   Patient Name: Lissa Cheng  MRN: [id number]  YOB: 1965    Referring Physician: Justine Parra PA     Chief Complaint: Osteoporosis  Chief Complaint   Patient presents with    Follow-up       History of Present Illness: Lissa Cheng is a 59 y.o. female who is here today for above osteoporosis with osteoarthritis.    She continues on Prolia every 6 months.  She started this in September 2016.  Medication is well-tolerated.  Her most recent dose was September 2024.  Most recent bone density was September 27, 2022.  It did show improvement in bone density on Prolia.      She denies any fracture.  No recent or upcoming dental work.  No thigh pain.    Reports she has cut back on her calcium supplementation due to GI issues.  She does take vitamin D daily.    Overall she reports she is doing well today.  No significant pain related to her osteoarthritis.      Subjective     Past Medical History:   Past Medical History:   Diagnosis Date    Acid reflux     Anxiety     Arthritis     Basal cell carcinoma     Fatigue     Fibrocystic breast     GERD (gastroesophageal reflux disease)     History of kidney stones     Irritability     Menopausal symptoms     Menopause     Mood swings     Osteoarthritis     Osteopenia     Osteoporosis     Ovarian cyst     right    Pain     Pain/discomfort    Pelvic pain     PONV (postoperative nausea and vomiting)     Uterine prolapse     Vaginal irritation        Past Surgical History:   Past Surgical History:   Procedure Laterality Date    BASAL CELL CARCINOMA EXCISION      BREAST BIOPSY Left 12/18/2007    COLONOSCOPY  2021    CYSTOSCOPY W/ LASER LITHOTRIPSY      X2    ENDOSCOPY      EXTRACORPOREAL SHOCKWAVE LITHOTRIPSY (ESWL), STENT INSERTION/REMOVAL Right 10/16/2020    Procedure: CYSTOSCOPY RIGHT STENT, RIGHT EXTRACORPOREAL SHOCKWAVE LITHOTRIPSY;  Surgeon: Hermilo Marquez MD;  Location: [facility] OR;   Service: Urology;  Laterality: Right;    HYSTERECTOMY  06/21/1999    WISDOM TOOTH EXTRACTION         Family History:   Family History   Problem Relation Age of Onset    Cancer Mother     Breast cancer Mother     Arthritis Mother     Heart attack Father     Cancer Sister     Breast cancer Sister     Cancer Sister     Colon cancer Sister     Breast cancer Other        Social History:   Social History     Socioeconomic History    Marital status:    Tobacco Use    Smoking status: Never     Passive exposure: Past    Smokeless tobacco: Never   Vaping Use    Vaping status: Never Used   Substance and Sexual Activity    Alcohol use: No    Drug use: No    Sexual activity: Yes     Partners: Male     Birth control/protection: Post-menopausal, Surgical       Medications:   Current Outpatient Medications:     ascorbic acid (VITAMIN C) 1000 MG tablet, , Disp: , Rfl:     Calcium Carb-Cholecalciferol (Calcium Plus D3 Absorbable) 600-62.5 MG-MCG capsule, , Disp: , Rfl:     colestipol (COLESTID) 1 g tablet, TAKE 1 TABLET BY MOUTH TWICE DAILY 1 HOUR BEFORE OR 1 HOUR AFTER OTHER MEDICATIONS, Disp: , Rfl:     denosumab (PROLIA) 60 MG/ML solution syringe, Inject 1 mL under the skin into the appropriate area as directed Every 6 (Six) Months., Disp: , Rfl:     estradiol (ESTRACE VAGINAL) 0.1 MG/GM vaginal cream, Insert 1 gm intravaginally twice weekly at bedtime., Disp: 42.5 g, Rfl: 4    famotidine-calcium carb-mag hydroxide (PEPCID COMPLETE) -165 MG chewable tablet, Chew 2 tablets Every Other Day., Disp: , Rfl:     HYDROcodone-acetaminophen (NORCO) 5-325 MG per tablet, TAKE 1 TABLET BY MOUTH EVERY 6 HOURS AS NEEDED FOR PAIN. DO NOT DRIVE IF TAKING THE MEDICATION TAKE ONLY FOR POST OPERATIVE PAIN, Disp: , Rfl:     Hydrocortisone, Perianal, (ANUSOL-HC) 2.5 % rectal cream, Insert 1 Application into the rectum 2 (Two) Times a Day As Needed. Apply topically to the affected area twice daily, Disp: , Rfl:     Menatetrenone (VITAMIN  "K2) 100 MCG tablet, Take 1 tablet by mouth Daily., Disp: , Rfl:     metroNIDAZOLE (METROCREAM) 0.75 % cream, Apply 1 Application topically to the appropriate area as directed 2 (Two) Times a Day., Disp: , Rfl:     Probiotic Product (Risaquad-2) capsule capsule, Take 1 capsule by mouth Daily., Disp: , Rfl:     vitamin B-12 (CYANOCOBALAMIN) 100 MCG tablet, Take 0.5 tablets by mouth Daily., Disp: , Rfl:     Vitamin D, Cholecalciferol, (CHOLECALCIFEROL) 400 UNITS tablet, Take 1 tablet by mouth Daily., Disp: , Rfl:     Zinc Sulfate (Zinc 15) 66 MG tablet, , Disp: , Rfl:     Allergies:   Allergies   Allergen Reactions    Bacitracin-Polymyxin B Other (See Comments)     Patient states that whenever she applies this medication, the skin lesion gets worse/infected.       I reviewed the patient's chief complaint, history of present illness, review of systems, past medical history, surgical history, family history, social history, medications and allergy list.     Objective    Vital Signs:   Vitals:    11/15/24 1124   BP: 120/70   BP Location: Left arm   Patient Position: Sitting   Cuff Size: Adult   Pulse: 74   Temp: 97.6 °F (36.4 °C)   Weight: 51.9 kg (114 lb 8 oz)   Height: 154.9 cm (61\")   PainSc:   6   PainLoc: Generalized       Body mass index is 21.63 kg/m².   BMI is within normal parameters. No other follow-up for BMI required.       Physical Exam:  MUSCULOSKELETAL:   No peripheral synovitis    Complete joint exam was performed including the MCPs, PIPs, DIPs of the hands, wrists, elbows, shoulders, hips, knees and ankles.  No soft tissue swelling or tenderness is present except as above.    General: The patient is well-developed and well nourished. Cooperative, alert and oriented. Affect is normal. Hydration appears normal.   HEENT: Normocephalic and atraumatic. No notable alopecia. Lids and conjunctiva are normal. Pupils are equal and sclera are clear. Oropharynx is clear   NECK neck is supple without adenopathy, " masses or thyromegaly.   CARDIOVASCULAR: Regular rate and rhythm. No murmurs, rubs or gallops   LUNGS: Effort is normal. Lungs are clear bilateral   ABDOMEN: Not examined  EXTREMITIES: Peripheral pulses are intact. No clubbing.   SKIN: No rashes. No subcutaneous nodules. No digital ulcers. No sclerodactyly.   NEUROLOGIC: Gait is normal. Strength testing is normal.  No focal neurologic deficits     Results Review:   Imaging Results (Last 24 Hours)       ** No results found for the last 24 hours. **            Assessment / Plan         1. Osteoporosis   Assessment & Plan:  Prior Evista 2008-3/22-GI upset  **Prolia started September 2016 when bone density scan showed osteoporosis in the hip  DEXA 9/13/2018 and ACL showed improved scores to osteopenia  *DEXA 9/27/2022 ACL with osteopenia with 10% improvement in the hip and 4% in the lumbar spine     She continues on calcium and vitamin D supplements daily  Continue weight bearing exercise  She is tolerating Prolia well.  Recommend continue Prol[facility] every 6 months at [facility]    Her most recent Prolia dose was given 10/9/2024    Labs reviewed 9/9/2024 with normal findings.  We will place a lab order for her to do 1 month prior to her next Prolia dose as below.  We will update bone density when we return to [facility] in 2025.  Return to clinic 6 months       2. Osteoarthritis, multiple sites  Assessment & Plan:  Overall stable and doing well clinically.  Tylenol as needed is okay        3. History of ongoing treatment with high-risk medication  Assessment & Plan:  Risks and benefits of Prolia were discussed in detail including but not limited to osteonecrosis of the jaw, atypical femur fracture, bone death, kidney failure, hypocalcemia.  Patient has no reported jaw or tooth pain.  No thigh pain.  No recent or upcoming dental work.       4. Positive DEO (antinuclear antibody)  Assessment & Plan:  Unspecified bases.  No evidence of lupus or connective  tissue disease.    Suspect false positive.        5. Right shoulder pain, unspecified chronicity  Assessment & Plan:  Intermittent since injury in June 2013.  She has previously seen [facility].  MRI August 2013 with Dr. Lombardi  Has also seen Dr. Saini NS  Subacromial bursitis    Not bothering her presently        Assessment/Plan:   Diagnoses and all orders for this visit:    1. Other osteoporosis without current pathological fracture (Primary)  -     Comprehensive Metabolic Panel; Future  -     CBC Auto Differential; Future  -     Sedimentation Rate; Future  -     Vitamin D,25-Hydroxy; Future    2. Vitamin D deficiency  -     Vitamin D,25-Hydroxy; Future    3. History of ongoing treatment with high-risk medication  -     Comprehensive Metabolic Panel; Future  -     CBC Auto Differential; Future  -     Sedimentation Rate; Future  -     Vitamin D,25-Hydroxy; Future          Follow Up:   Return in about 6 months (around 5/15/2025).        Massimo Choudhary MD  [facility]

## 2025-03-27 DIAGNOSIS — M81.0 OSTEOPOROSIS WITHOUT CURRENT PATHOLOGICAL FRACTURE, UNSPECIFIED OSTEOPOROSIS TYPE: Primary | ICD-10-CM

## 2025-03-27 DIAGNOSIS — E55.9 VITAMIN D DEFICIENCY: Primary | ICD-10-CM

## 2025-03-27 DIAGNOSIS — M81.0 OSTEOPOROSIS WITHOUT CURRENT PATHOLOGICAL FRACTURE, UNSPECIFIED OSTEOPOROSIS TYPE: ICD-10-CM

## 2025-03-28 DIAGNOSIS — M81.8 OTHER OSTEOPOROSIS WITHOUT CURRENT PATHOLOGICAL FRACTURE: Primary | ICD-10-CM

## 2025-04-09 ENCOUNTER — INFUSION (OUTPATIENT)
Dept: ONCOLOGY | Facility: HOSPITAL | Age: 60
End: 2025-04-09
Payer: COMMERCIAL

## 2025-04-09 VITALS
TEMPERATURE: 98.6 F | DIASTOLIC BLOOD PRESSURE: 83 MMHG | SYSTOLIC BLOOD PRESSURE: 130 MMHG | HEART RATE: 78 BPM | RESPIRATION RATE: 16 BRPM

## 2025-04-09 DIAGNOSIS — M81.8 OTHER OSTEOPOROSIS WITHOUT CURRENT PATHOLOGICAL FRACTURE: Primary | ICD-10-CM

## 2025-04-09 LAB
ALBUMIN SERPL-MCNC: 4.1 G/DL (ref 3.5–5.2)
ALBUMIN/GLOB SERPL: 1.5 G/DL
ALP SERPL-CCNC: 64 U/L (ref 39–117)
ALT SERPL W P-5'-P-CCNC: 18 U/L (ref 1–33)
ANION GAP SERPL CALCULATED.3IONS-SCNC: 9 MMOL/L (ref 5–15)
AST SERPL-CCNC: 23 U/L (ref 1–32)
BILIRUB SERPL-MCNC: 0.3 MG/DL (ref 0–1.2)
BUN SERPL-MCNC: 11 MG/DL (ref 8–23)
BUN/CREAT SERPL: 11.5 (ref 7–25)
CALCIUM SPEC-SCNC: 9.4 MG/DL (ref 8.6–10.5)
CHLORIDE SERPL-SCNC: 105 MMOL/L (ref 98–107)
CO2 SERPL-SCNC: 25 MMOL/L (ref 22–29)
CREAT SERPL-MCNC: 0.96 MG/DL (ref 0.57–1)
EGFRCR SERPLBLD CKD-EPI 2021: 67.5 ML/MIN/1.73
GLOBULIN UR ELPH-MCNC: 2.7 GM/DL
GLUCOSE SERPL-MCNC: 85 MG/DL (ref 65–99)
POTASSIUM SERPL-SCNC: 4 MMOL/L (ref 3.5–5.2)
PROT SERPL-MCNC: 6.8 G/DL (ref 6–8.5)
SODIUM SERPL-SCNC: 139 MMOL/L (ref 136–145)

## 2025-04-09 PROCEDURE — 82306 VITAMIN D 25 HYDROXY: CPT

## 2025-04-09 PROCEDURE — 96372 THER/PROPH/DIAG INJ SC/IM: CPT

## 2025-04-09 PROCEDURE — 80053 COMPREHEN METABOLIC PANEL: CPT

## 2025-04-09 PROCEDURE — 36415 COLL VENOUS BLD VENIPUNCTURE: CPT

## 2025-04-09 PROCEDURE — 25010000002 DENOSUMAB 60 MG/ML SOLUTION PREFILLED SYRINGE: Performed by: INTERNAL MEDICINE

## 2025-04-09 RX ADMIN — DENOSUMAB 60 MG: 60 INJECTION SUBCUTANEOUS at 13:12

## 2025-04-10 LAB — 25(OH)D3 SERPL-MCNC: 88.4 NG/ML (ref 30–100)

## 2025-05-06 NOTE — PROGRESS NOTES
Office Visit       Date: 05/15/2025   Patient Name: Lissa Cheng  MRN: [id number]  YOB: 1965    Referring Physician: Massimo Choudhary MD     Chief Complaint: Osteoporosis  Chief Complaint   Patient presents with    Osteoporosis           Arthritis       History of Present Illness: Lissa Cheng is a 59 y.o. female who is here today for above osteoporosis with osteoarthritis.    She continues on Prolia every 6 months.    She started this in September 2016.  Medication is well-tolerated.    Her most recent dose was 4/9/25.    She denies any fracture or falls. No recent or upcoming dental work. No jaw pain or thigh pain.    She has cut back on calcium supplementation due to GI issues. She does take vitamin D daily.    Overall she reports she is doing well today. No significant pain related to her osteoarthritis.    Her sister is going through cancer treatments. This has been stressful for her.    Subjective     Past Medical History:   Past Medical History:   Diagnosis Date    Acid reflux     Anxiety     Arthritis     Basal cell carcinoma     Fatigue     Fibrocystic breast     GERD (gastroesophageal reflux disease)     History of kidney stones     Irritability     Menopausal symptoms     Menopause     Mood swings     Osteoarthritis     Osteopenia     Osteoporosis     Ovarian cyst     right    Pain     Pain/discomfort    Pelvic pain     PONV (postoperative nausea and vomiting)     Uterine prolapse     Vaginal irritation        Past Surgical History:   Past Surgical History:   Procedure Laterality Date    BASAL CELL CARCINOMA EXCISION      BREAST BIOPSY Left 12/18/2007    COLONOSCOPY  2021    CYSTOSCOPY W/ LASER LITHOTRIPSY      X2    ENDOSCOPY      EXTRACORPOREAL SHOCKWAVE LITHOTRIPSY (ESWL), STENT INSERTION/REMOVAL Right 10/16/2020    Procedure: CYSTOSCOPY RIGHT STENT, RIGHT EXTRACORPOREAL SHOCKWAVE LITHOTRIPSY;  Surgeon: Hermilo Marquez MD;  Location: [facility] OR;   Service: Urology;  Laterality: Right;    HYSTERECTOMY  06/21/1999    WISDOM TOOTH EXTRACTION         Family History:   Family History   Problem Relation Age of Onset    Cancer Mother     Breast cancer Mother     Arthritis Mother     Heart attack Father     Cancer Sister     Breast cancer Sister     Cancer Sister     Colon cancer Sister     Breast cancer Other        Social History:   Social History     Socioeconomic History    Marital status:    Tobacco Use    Smoking status: Never     Passive exposure: Past    Smokeless tobacco: Never   Vaping Use    Vaping status: Never Used   Substance and Sexual Activity    Alcohol use: No    Drug use: No    Sexual activity: Yes     Partners: Male     Birth control/protection: Post-menopausal, Surgical       Medications:   Current Outpatient Medications:     ascorbic acid (VITAMIN C) 1000 MG tablet, , Disp: , Rfl:     Calcium Carb-Cholecalciferol (Calcium Plus D3 Absorbable) 600-62.5 MG-MCG capsule, , Disp: , Rfl:     colestipol (COLESTID) 1 g tablet, TAKE 1 TABLET BY MOUTH TWICE DAILY 1 HOUR BEFORE OR 1 HOUR AFTER OTHER MEDICATIONS, Disp: , Rfl:     denosumab (PROLIA) 60 MG/ML solution syringe, Inject 1 mL under the skin into the appropriate area as directed Every 6 (Six) Months., Disp: , Rfl:     famotidine-calcium carb-mag hydroxide (PEPCID COMPLETE) -165 MG chewable tablet, Chew 2 tablets Every Other Day., Disp: , Rfl:     magnesium oxide (MAGOX) 200 (120.7 MG) MG tablet half tablet, Take 2 half tablet by mouth Daily., Disp: , Rfl:     Menatetrenone (VITAMIN K2) 100 MCG tablet, Take 1 tablet by mouth Daily., Disp: , Rfl:     metroNIDAZOLE (METROCREAM) 0.75 % cream, Apply 1 Application topically to the appropriate area as directed 2 (Two) Times a Day., Disp: , Rfl:     multivitamin with minerals tablet tablet, Take 1 tablet by mouth Daily., Disp: , Rfl:     Probiotic Product (Risaquad-2) capsule capsule, Take 1 capsule by mouth Daily., Disp: , Rfl:     vitamin  "B-12 (CYANOCOBALAMIN) 100 MCG tablet, Take 0.5 tablets by mouth Daily., Disp: , Rfl:     Vitamin D, Cholecalciferol, (CHOLECALCIFEROL) 400 UNITS tablet, Take 1 tablet by mouth Daily., Disp: , Rfl:     Zinc Sulfate (Zinc 15) 66 MG tablet, , Disp: , Rfl:     Allergies:   Allergies   Allergen Reactions    Bacitracin-Polymyxin B Other (See Comments)     Patient states that whenever she applies this medication, the skin lesion gets worse/infected.       I reviewed the patient's chief complaint, history of present illness, review of systems, past medical history, surgical history, family history, social history, medications and allergy list.     Objective    Vital Signs:   Vitals:    05/15/25 1119   BP: 122/66   Pulse: 68   Temp: 97.1 °F (36.2 °C)   Weight: 55.5 kg (122 lb 4.8 oz)   Height: 154.9 cm (61\")   PainSc: 2          Body mass index is 23.11 kg/m².   BMI is within normal parameters. No other follow-up for BMI required.     Physical Exam:  MUSCULOSKELETAL:   No peripheral synovitis    Complete joint exam was performed including the MCPs, PIPs, DIPs of the hands, wrists, elbows, shoulders, hips, knees and ankles.  No soft tissue swelling or tenderness is present except as above.    General: The patient is well-developed and well nourished. Cooperative, alert and oriented. Affect is normal. Hydration appears normal.   HEENT: Normocephalic and atraumatic. No notable alopecia. Lids and conjunctiva are normal. Pupils are equal and sclera are clear.   NECK neck is supple without adenopathy, masses or thyromegaly.   CARDIOVASCULAR: Regular rate and rhythm.   LUNGS: Effort is normal.  ABDOMEN: Not examined  EXTREMITIES: Peripheral pulses are intact. No clubbing.   SKIN: No rashes. No subcutaneous nodules. No digital ulcers. No sclerodactyly.   NEUROLOGIC: Gait is normal. Strength testing is normal.  No focal neurologic deficits       Assessment / Plan         Assessment & Plan  Other osteoporosis without current " pathological fracture  Prior Evista 2008-3/22-GI upset  **Prolia started September 2016 when bone density scan showed osteoporosis in the hip  DEXA 9/13/2018 and ACL showed improved scores to osteopenia  *DEXA 9/27/2022 ACL with osteopenia with 10% improvement in the hip and 4% in the lumbar spine     She continues on calcium and vitamin D supplements daily  Continue weight bearing exercise  She is tolerating Prolia well.    Recommend continue Prolia every 6 months at [facility].    Her most recent Prolia dose was given 4/9/25. Next dose due 10/8/25.  We will update bone density when we return to [facility] later this year.  Return to clinic end of September 2025 to better align injections with visits  Osteoarthritis, unspecified osteoarthritis type, unspecified site  Overall stable and doing well clinically.  Tylenol as needed is okay     Vitamin D deficiency  Check level every 6 months prior to Prolia  Continue daily OTC supplement  History of ongoing treatment with high-risk medication  Prolia well tolerated    Risks and benefits of Prolia were discussed in detail including but not limited to osteonecrosis of the jaw, atypical femur fracture, bone death, kidney failure, hypocalcemia.  Patient has no reported jaw or tooth pain.  No thigh pain.  No recent or upcoming dental work.     Positive DEO (antinuclear antibody)  Unspecified basis.    No evidence of lupus or connective tissue disease.  Probable false positive.  Right shoulder pain, unspecified chronicity  Intermittent since injury in June 2013.  She has previously seen [facility].  MRI August 2013 with Dr. Lombardi  Has also seen Dr. Saini NS  Subacromial bursitis    Not bothering her presently      Follow Up:   Return in about 5 months (around 9/29/2025) for ANNAMARIE Morales, ANNAMARIE Hanna.        ANNAMARIE Hanna  [facility]

## 2025-05-15 ENCOUNTER — OFFICE VISIT (OUTPATIENT)
Age: 60
End: 2025-05-15
Payer: COMMERCIAL

## 2025-05-15 VITALS
HEART RATE: 68 BPM | SYSTOLIC BLOOD PRESSURE: 122 MMHG | TEMPERATURE: 97.1 F | WEIGHT: 122.3 LBS | BODY MASS INDEX: 23.09 KG/M2 | DIASTOLIC BLOOD PRESSURE: 66 MMHG | HEIGHT: 61 IN

## 2025-05-15 DIAGNOSIS — R76.8 POSITIVE ANA (ANTINUCLEAR ANTIBODY): ICD-10-CM

## 2025-05-15 DIAGNOSIS — M19.90 OSTEOARTHRITIS, UNSPECIFIED OSTEOARTHRITIS TYPE, UNSPECIFIED SITE: ICD-10-CM

## 2025-05-15 DIAGNOSIS — M81.8 OTHER OSTEOPOROSIS WITHOUT CURRENT PATHOLOGICAL FRACTURE: Primary | ICD-10-CM

## 2025-05-15 DIAGNOSIS — M25.511 RIGHT SHOULDER PAIN, UNSPECIFIED CHRONICITY: ICD-10-CM

## 2025-05-15 DIAGNOSIS — E55.9 VITAMIN D DEFICIENCY: ICD-10-CM

## 2025-05-15 DIAGNOSIS — Z79.899 HISTORY OF ONGOING TREATMENT WITH HIGH-RISK MEDICATION: ICD-10-CM

## 2025-05-15 RX ORDER — MULTIPLE VITAMINS W/ MINERALS TAB 9MG-400MCG
1 TAB ORAL DAILY
COMMUNITY

## 2025-05-15 NOTE — ASSESSMENT & PLAN NOTE
Prior Evista 2008-3/22-GI upset  **Prolia started September 2016 when bone density scan showed osteoporosis in the hip  DEXA 9/13/2018 and ACL showed improved scores to osteopenia  *DEXA 9/27/2022 ACL with osteopenia with 10% improvement in the hip and 4% in the lumbar spine     She continues on calcium and vitamin D supplements daily  Continue weight bearing exercise  She is tolerating Prolia well.    Recommend continue Prolia every 6 months at [facility].    Her most recent Prolia dose was given 4/9/25. Next dose due 10/8/25.  We will update bone density when we return to [facility] location later this year.  Return to clinic end of September 2025 to better align injections with visits

## 2025-05-15 NOTE — ASSESSMENT & PLAN NOTE
Unspecified basis.    No evidence of lupus or connective tissue disease.  Probable false positive.

## 2025-05-15 NOTE — ASSESSMENT & PLAN NOTE
Prolia well tolerated    Risks and benefits of Prolia were discussed in detail including but not limited to osteonecrosis of the jaw, atypical femur fracture, bone death, kidney failure, hypocalcemia.  Patient has no reported jaw or tooth pain.  No thigh pain.  No recent or upcoming dental work.

## 2025-06-11 ENCOUNTER — TELEPHONE (OUTPATIENT)
Age: 60
End: 2025-06-11
Payer: COMMERCIAL

## 2025-06-11 DIAGNOSIS — Z78.0 POST-MENOPAUSE: Primary | ICD-10-CM

## 2025-06-11 NOTE — TELEPHONE ENCOUNTER
Spoke with patient and she states earlier this year she spoke with Ethan and she needed her DEXA to be scheduled after we came back to [place] in June. She states she needs this due to her Prolia injections.    I don't currently see an order in the patients chart. Her next follow up visit is with Toby on 09/30/25. Does patient need to have this DEXA completed prior to that appointment or can she wait until then?    Please review and advise and place order if necessary.

## 2025-06-17 NOTE — TELEPHONE ENCOUNTER
Spoke with patient and scheduled her DEXA on next follow up appointment with Toby Fontanez. Next appointment is 11am on 09/30/25 for DEXA and 11:45am follow up with Evva.     Patient agreed and appointment was scheduled.

## (undated) DEVICE — NITINOL WIRE WITH HYDROPHILIC TIP: Brand: SENSOR

## (undated) DEVICE — GLV SURG SENSICARE W/ALOE PF LF 7.5 STRL

## (undated) DEVICE — PK CYSTO-TUR BASIC 10